# Patient Record
Sex: FEMALE | Race: WHITE | NOT HISPANIC OR LATINO
[De-identification: names, ages, dates, MRNs, and addresses within clinical notes are randomized per-mention and may not be internally consistent; named-entity substitution may affect disease eponyms.]

---

## 2023-11-09 ENCOUNTER — APPOINTMENT (OUTPATIENT)
Dept: OBGYN | Facility: CLINIC | Age: 28
End: 2023-11-09

## 2023-12-05 PROBLEM — Z00.00 ENCOUNTER FOR PREVENTIVE HEALTH EXAMINATION: Status: ACTIVE | Noted: 2023-12-05

## 2023-12-21 ENCOUNTER — APPOINTMENT (OUTPATIENT)
Dept: OBGYN | Facility: CLINIC | Age: 28
End: 2023-12-21
Payer: COMMERCIAL

## 2023-12-21 VITALS — DIASTOLIC BLOOD PRESSURE: 90 MMHG | SYSTOLIC BLOOD PRESSURE: 134 MMHG | WEIGHT: 105 LBS

## 2023-12-21 DIAGNOSIS — O20.9 HEMORRHAGE IN EARLY PREGNANCY, UNSPECIFIED: ICD-10-CM

## 2023-12-21 LAB
BILIRUB UR QL STRIP: NORMAL
GLUCOSE UR-MCNC: NORMAL
HCG UR QL: 1 EU/DL
HCG UR QL: POSITIVE
HGB UR QL STRIP.AUTO: NORMAL
KETONES UR-MCNC: NORMAL
LEUKOCYTE ESTERASE UR QL STRIP: NORMAL
NITRITE UR QL STRIP: NORMAL
PH UR STRIP: 5.5
PROT UR STRIP-MCNC: NORMAL
SP GR UR STRIP: 1.03

## 2023-12-21 PROCEDURE — 76817 TRANSVAGINAL US OBSTETRIC: CPT

## 2023-12-21 PROCEDURE — 81025 URINE PREGNANCY TEST: CPT

## 2023-12-21 PROCEDURE — 81003 URINALYSIS AUTO W/O SCOPE: CPT | Mod: QW

## 2023-12-21 PROCEDURE — 36415 COLL VENOUS BLD VENIPUNCTURE: CPT

## 2023-12-21 PROCEDURE — 99203 OFFICE O/P NEW LOW 30 MIN: CPT | Mod: 25

## 2023-12-22 LAB
BASOPHILS # BLD AUTO: 0.02 K/UL
BASOPHILS NFR BLD AUTO: 0.2 %
EOSINOPHIL # BLD AUTO: 0.04 K/UL
EOSINOPHIL NFR BLD AUTO: 0.4 %
HCT VFR BLD CALC: 43.1 %
HGB BLD-MCNC: 14.2 G/DL
HIV1+2 AB SPEC QL IA.RAPID: NONREACTIVE
IMM GRANULOCYTES NFR BLD AUTO: 0.2 %
LYMPHOCYTES # BLD AUTO: 2.08 K/UL
LYMPHOCYTES NFR BLD AUTO: 22.5 %
MAN DIFF?: NORMAL
MCHC RBC-ENTMCNC: 30.5 PG
MCHC RBC-ENTMCNC: 32.9 GM/DL
MCV RBC AUTO: 92.5 FL
MONOCYTES # BLD AUTO: 0.59 K/UL
MONOCYTES NFR BLD AUTO: 6.4 %
NEUTROPHILS # BLD AUTO: 6.48 K/UL
NEUTROPHILS NFR BLD AUTO: 70.3 %
PLATELET # BLD AUTO: 205 K/UL
PROGEST SERPL-MCNC: 53.6 NG/ML
RBC # BLD: 4.66 M/UL
RBC # FLD: 13.3 %
WBC # FLD AUTO: 9.23 K/UL

## 2023-12-24 NOTE — HISTORY OF PRESENT ILLNESS
[FreeTextEntry1] : Pt is a , LMP none, had SAB 10/30/23, bled for 5-6 days. Started staining last night, brown d/c, stopped, last relations 4 days ago Rh +   Ob/gyn Hx- Primary c/s at 40.4 wks, nr fhr @ 2cm. , hx of bilateral fibroadenomas of the breasts, followed at [facility] by Dr Lund, SAB x 1 Med/Surg hx -c/s  [Patient reported PAP Smear was normal] : Patient reported PAP Smear was normal [PapSmeardate] : 2023

## 2023-12-24 NOTE — PROCEDURE
[Transvaginal OB Sonogram] : Transvaginal OB Sonogram [Intrauterine Pregnancy] : intrauterine pregnancy [Yolk Sac] : yolk sac present [Fetal Heart] : fetal heart present [CRL: ___ (mm)] : CRL - [unfilled]Umm [Date: ___] : Date: [unfilled] [Current GA by Sonogram: ___ (wks)] : Current GA by Sonogram: [unfilled]Uwks [___ day(s)] : [unfilled] days [Transvaginal OB Sonogram WNL] : Transvaginal OB Sonogram WNL [FreeTextEntry1] : Viable IUP crl 7.5mm-6.5 wks, good fhr, edc established.for 8/10/24

## 2023-12-24 NOTE — PLAN
[FreeTextEntry1] : Viable IUP seen on today encounter. EDC established for 8/10/24, will want TOLAC, r/b/a reviewed.  -Precautions /instruction -will need records  -Prenatal labs sent from office  -Refused aneuploidy /NTD screening  -Vaccines discussed, recommend Covid vaccine -Rv 2 wks for PNC or PRN

## 2023-12-26 LAB
ABO + RH PNL BLD: NORMAL
B19V IGG SER QL IA: NEGATIVE
B19V IGG+IGM SER-IMP: NORMAL
B19V IGM FLD-ACNC: NEGATIVE
BACTERIA UR CULT: NORMAL
BLD GP AB SCN SERPL QL: NORMAL
HBV SURFACE AG SER QL: NONREACTIVE
HCV AB SER QL: NONREACTIVE
HCV S/CO RATIO: 0.22 S/CO
LEAD BLD-MCNC: <1 UG/DL
MEV IGG FLD QL IA: 101 AU/ML
MEV IGG+IGM SER-IMP: POSITIVE
MUV AB SER-ACNC: POSITIVE
MUV IGG SER QL IA: 40 AU/ML
RUBV IGG FLD-ACNC: 11 INDEX
RUBV IGG SER-IMP: POSITIVE
T PALLIDUM AB SER QL IA: NEGATIVE
VZV AB TITR SER: POSITIVE
VZV IGG SER IF-ACNC: 452.9 INDEX

## 2024-01-04 ENCOUNTER — TRANSCRIPTION ENCOUNTER (OUTPATIENT)
Age: 29
End: 2024-01-04

## 2024-01-11 ENCOUNTER — APPOINTMENT (OUTPATIENT)
Dept: OBGYN | Facility: CLINIC | Age: 29
End: 2024-01-11
Payer: COMMERCIAL

## 2024-01-11 VITALS — DIASTOLIC BLOOD PRESSURE: 84 MMHG | SYSTOLIC BLOOD PRESSURE: 128 MMHG | WEIGHT: 106 LBS

## 2024-01-11 DIAGNOSIS — O36.80X0 PREGNANCY WITH INCONCLUSIVE FETAL VIABILITY, NOT APPLICABLE OR UNSPECIFIED: ICD-10-CM

## 2024-01-11 PROCEDURE — 0501F PRENATAL FLOW SHEET: CPT

## 2024-01-11 PROCEDURE — 76817 TRANSVAGINAL US OBSTETRIC: CPT

## 2024-01-11 PROCEDURE — 36415 COLL VENOUS BLD VENIPUNCTURE: CPT

## 2024-01-14 PROBLEM — O36.80X0 ENCOUNTER TO DETERMINE FETAL VIABILITY OF PREGNANCY: Status: ACTIVE | Noted: 2024-01-14

## 2024-01-17 ENCOUNTER — NON-APPOINTMENT (OUTPATIENT)
Age: 29
End: 2024-01-17

## 2024-02-06 ENCOUNTER — NON-APPOINTMENT (OUTPATIENT)
Age: 29
End: 2024-02-06

## 2024-02-08 ENCOUNTER — APPOINTMENT (OUTPATIENT)
Dept: OBGYN | Facility: CLINIC | Age: 29
End: 2024-02-08
Payer: COMMERCIAL

## 2024-02-08 VITALS — DIASTOLIC BLOOD PRESSURE: 88 MMHG | WEIGHT: 108 LBS | SYSTOLIC BLOOD PRESSURE: 138 MMHG

## 2024-02-08 DIAGNOSIS — I10 ESSENTIAL (PRIMARY) HYPERTENSION: ICD-10-CM

## 2024-02-08 LAB
BILIRUB UR QL STRIP: NORMAL
GLUCOSE UR-MCNC: NORMAL
HCG UR QL: 0.2 EU/DL
HGB UR QL STRIP.AUTO: NORMAL
KETONES UR-MCNC: NORMAL
LEUKOCYTE ESTERASE UR QL STRIP: NORMAL
NITRITE UR QL STRIP: NORMAL
PH UR STRIP: 5.5
PROT UR STRIP-MCNC: NORMAL
SP GR UR STRIP: 1.02

## 2024-02-08 PROCEDURE — 36415 COLL VENOUS BLD VENIPUNCTURE: CPT

## 2024-02-08 PROCEDURE — 81003 URINALYSIS AUTO W/O SCOPE: CPT | Mod: NC,QW

## 2024-02-08 PROCEDURE — 0502F SUBSEQUENT PRENATAL CARE: CPT

## 2024-02-08 RX ORDER — BLOOD PRESSURE TEST KIT-LARGE
KIT MISCELLANEOUS
Qty: 1 | Refills: 0 | Status: ACTIVE | COMMUNITY
Start: 2024-02-08 | End: 1900-01-01

## 2024-02-09 ENCOUNTER — NON-APPOINTMENT (OUTPATIENT)
Age: 29
End: 2024-02-09

## 2024-02-09 LAB
ALBUMIN SERPL ELPH-MCNC: 4.4 G/DL
ALP BLD-CCNC: 52 U/L
ALT SERPL-CCNC: 7 U/L
ANION GAP SERPL CALC-SCNC: 15 MMOL/L
AST SERPL-CCNC: 15 U/L
BILIRUB SERPL-MCNC: 1.3 MG/DL
BUN SERPL-MCNC: 7 MG/DL
CALCIUM SERPL-MCNC: 9.8 MG/DL
CHLORIDE SERPL-SCNC: 104 MMOL/L
CO2 SERPL-SCNC: 22 MMOL/L
CREAT SERPL-MCNC: 0.41 MG/DL
EGFR: 138 ML/MIN/1.73M2
GLUCOSE SERPL-MCNC: 54 MG/DL
LDH SERPL-CCNC: 182 U/L
POTASSIUM SERPL-SCNC: 4.2 MMOL/L
PROT SERPL-MCNC: 6.5 G/DL
SODIUM SERPL-SCNC: 140 MMOL/L
URATE SERPL-MCNC: 2.2 MG/DL

## 2024-03-04 ENCOUNTER — RESULT CHARGE (OUTPATIENT)
Age: 29
End: 2024-03-04

## 2024-03-04 ENCOUNTER — APPOINTMENT (OUTPATIENT)
Dept: OBGYN | Facility: CLINIC | Age: 29
End: 2024-03-04
Payer: COMMERCIAL

## 2024-03-04 VITALS
DIASTOLIC BLOOD PRESSURE: 83 MMHG | WEIGHT: 112 LBS | SYSTOLIC BLOOD PRESSURE: 132 MMHG | HEIGHT: 66 IN | BODY MASS INDEX: 18 KG/M2

## 2024-03-04 LAB
BILIRUB UR QL STRIP: NORMAL
GLUCOSE UR-MCNC: NORMAL
HCG UR QL: 0.2 EU/DL
HGB UR QL STRIP.AUTO: NORMAL
KETONES UR-MCNC: NORMAL
LEUKOCYTE ESTERASE UR QL STRIP: NORMAL
NITRITE UR QL STRIP: NORMAL
PH UR STRIP: 5.5
PROT UR STRIP-MCNC: NORMAL
SP GR UR STRIP: 1.03

## 2024-03-04 PROCEDURE — 81003 URINALYSIS AUTO W/O SCOPE: CPT | Mod: NC,QW

## 2024-03-04 PROCEDURE — 0502F SUBSEQUENT PRENATAL CARE: CPT

## 2024-03-06 LAB
CREAT 24H UR-MCNC: 0.9 G/24 HR
CREAT 24H UR-MCNC: 0.9 G/24 HR
CREAT ?TM UR-MCNC: 60 MG/DL
CREAT ?TM UR-MCNC: 60 MG/DL
PROT 24H UR-MRATE: 6 MG/DL
PROT ?TM UR-MCNC: 24 HR
PROT ?TM UR-MCNC: 24 HR
PROT UR-MCNC: 90 MG/24 H
SPECIMEN VOL 24H UR: 1500 ML
SPECIMEN VOL 24H UR: 1500 ML

## 2024-03-28 ENCOUNTER — NON-APPOINTMENT (OUTPATIENT)
Age: 29
End: 2024-03-28

## 2024-04-01 ENCOUNTER — ASOB RESULT (OUTPATIENT)
Age: 29
End: 2024-04-01

## 2024-04-01 ENCOUNTER — APPOINTMENT (OUTPATIENT)
Dept: OBGYN | Facility: CLINIC | Age: 29
End: 2024-04-01
Payer: COMMERCIAL

## 2024-04-01 ENCOUNTER — APPOINTMENT (OUTPATIENT)
Dept: ANTEPARTUM | Facility: CLINIC | Age: 29
End: 2024-04-01
Payer: COMMERCIAL

## 2024-04-01 VITALS — SYSTOLIC BLOOD PRESSURE: 131 MMHG | BODY MASS INDEX: 18.72 KG/M2 | WEIGHT: 116 LBS | DIASTOLIC BLOOD PRESSURE: 84 MMHG

## 2024-04-01 LAB
BILIRUB UR QL STRIP: NORMAL
GLUCOSE UR-MCNC: 100
HCG UR QL: 0.2 EU/DL
HGB UR QL STRIP.AUTO: NORMAL
KETONES UR-MCNC: NORMAL
LEUKOCYTE ESTERASE UR QL STRIP: NORMAL
NITRITE UR QL STRIP: NORMAL
PH UR STRIP: 6
PROT UR STRIP-MCNC: NORMAL
SP GR UR STRIP: 1.01

## 2024-04-01 PROCEDURE — 36415 COLL VENOUS BLD VENIPUNCTURE: CPT

## 2024-04-01 PROCEDURE — 0502F SUBSEQUENT PRENATAL CARE: CPT

## 2024-04-01 PROCEDURE — 81003 URINALYSIS AUTO W/O SCOPE: CPT | Mod: NC,QW

## 2024-04-01 PROCEDURE — 76811 OB US DETAILED SNGL FETUS: CPT

## 2024-04-07 ENCOUNTER — NON-APPOINTMENT (OUTPATIENT)
Age: 29
End: 2024-04-07

## 2024-04-07 LAB
AFP MOM: 0.64
AFP VALUE: 53.2 NG/ML
ALPHA FETOPROTEIN SERUM COMMENT: NORMAL
ALPHA FETOPROTEIN SERUM INTERPRETATION: NORMAL
ALPHA FETOPROTEIN SERUM RESULTS: NORMAL
ALPHA FETOPROTEIN SERUM TEST RESULTS: NORMAL
GESTATIONAL AGE BASED ON: NORMAL
GESTATIONAL AGE ON COLLECTION DATE: 21.3 WEEKS
INSULIN DEP DIABETES: NO
MATERNAL AGE AT EDD AFP: 27.5 YR
MULTIPLE GESTATION: NO
OSBR RISK 1 IN: NORMAL
RACE: NORMAL
WEIGHT AFP: 116 LBS

## 2024-05-02 ENCOUNTER — NON-APPOINTMENT (OUTPATIENT)
Age: 29
End: 2024-05-02

## 2024-05-06 ENCOUNTER — ASOB RESULT (OUTPATIENT)
Age: 29
End: 2024-05-06

## 2024-05-06 ENCOUNTER — APPOINTMENT (OUTPATIENT)
Dept: OBGYN | Facility: CLINIC | Age: 29
End: 2024-05-06
Payer: COMMERCIAL

## 2024-05-06 ENCOUNTER — APPOINTMENT (OUTPATIENT)
Dept: ANTEPARTUM | Facility: CLINIC | Age: 29
End: 2024-05-06
Payer: COMMERCIAL

## 2024-05-06 ENCOUNTER — NON-APPOINTMENT (OUTPATIENT)
Age: 29
End: 2024-05-06

## 2024-05-06 VITALS — WEIGHT: 126 LBS | SYSTOLIC BLOOD PRESSURE: 120 MMHG | BODY MASS INDEX: 20.34 KG/M2 | DIASTOLIC BLOOD PRESSURE: 81 MMHG

## 2024-05-06 LAB
BILIRUB UR QL STRIP: NORMAL
GLUCOSE UR-MCNC: 100
HCG UR QL: 0.2 EU/DL
HGB UR QL STRIP.AUTO: NORMAL
KETONES UR-MCNC: NORMAL
LEUKOCYTE ESTERASE UR QL STRIP: NORMAL
NITRITE UR QL STRIP: NORMAL
PH UR STRIP: 6.5
PROT UR STRIP-MCNC: NORMAL
SP GR UR STRIP: 1.01

## 2024-05-06 PROCEDURE — 76816 OB US FOLLOW-UP PER FETUS: CPT

## 2024-05-06 PROCEDURE — 81003 URINALYSIS AUTO W/O SCOPE: CPT | Mod: NC,QW

## 2024-05-06 PROCEDURE — 36415 COLL VENOUS BLD VENIPUNCTURE: CPT

## 2024-05-06 PROCEDURE — 0502F SUBSEQUENT PRENATAL CARE: CPT

## 2024-05-07 ENCOUNTER — NON-APPOINTMENT (OUTPATIENT)
Age: 29
End: 2024-05-07

## 2024-05-07 LAB
GLUCOSE 1H P 50 G GLC PO SERPL-MCNC: 122 MG/DL
HCT VFR BLD CALC: 36 %
HGB BLD-MCNC: 11.6 G/DL
MCHC RBC-ENTMCNC: 32.2 GM/DL
MCHC RBC-ENTMCNC: 33.2 PG
MCV RBC AUTO: 103.2 FL
PLATELET # BLD AUTO: 128 K/UL
RBC # BLD: 3.49 M/UL
RBC # FLD: 14.3 %
WBC # FLD AUTO: 8.96 K/UL

## 2024-05-13 ENCOUNTER — APPOINTMENT (OUTPATIENT)
Dept: ANTEPARTUM | Facility: CLINIC | Age: 29
End: 2024-05-13

## 2024-06-06 ENCOUNTER — APPOINTMENT (OUTPATIENT)
Dept: OBGYN | Facility: CLINIC | Age: 29
End: 2024-06-06
Payer: COMMERCIAL

## 2024-06-06 VITALS — DIASTOLIC BLOOD PRESSURE: 71 MMHG | WEIGHT: 130 LBS | BODY MASS INDEX: 20.98 KG/M2 | SYSTOLIC BLOOD PRESSURE: 112 MMHG

## 2024-06-06 LAB
BILIRUB UR QL STRIP: NORMAL
GLUCOSE UR-MCNC: NORMAL
HCG UR QL: 0.2 EU/DL
HGB UR QL STRIP.AUTO: NORMAL
KETONES UR-MCNC: NORMAL
LEUKOCYTE ESTERASE UR QL STRIP: NORMAL
NITRITE UR QL STRIP: NORMAL
PH UR STRIP: 6
PROT UR STRIP-MCNC: NORMAL
SP GR UR STRIP: 1.03

## 2024-06-06 PROCEDURE — 90471 IMMUNIZATION ADMIN: CPT

## 2024-06-06 PROCEDURE — 90715 TDAP VACCINE 7 YRS/> IM: CPT

## 2024-06-06 PROCEDURE — 81003 URINALYSIS AUTO W/O SCOPE: CPT | Mod: NC,QW

## 2024-06-06 PROCEDURE — 0502F SUBSEQUENT PRENATAL CARE: CPT

## 2024-06-26 ENCOUNTER — APPOINTMENT (OUTPATIENT)
Dept: OBGYN | Facility: CLINIC | Age: 29
End: 2024-06-26
Payer: COMMERCIAL

## 2024-06-26 ENCOUNTER — ASOB RESULT (OUTPATIENT)
Age: 29
End: 2024-06-26

## 2024-06-26 ENCOUNTER — APPOINTMENT (OUTPATIENT)
Dept: ANTEPARTUM | Facility: CLINIC | Age: 29
End: 2024-06-26
Payer: COMMERCIAL

## 2024-06-26 VITALS — SYSTOLIC BLOOD PRESSURE: 120 MMHG | WEIGHT: 132 LBS | DIASTOLIC BLOOD PRESSURE: 75 MMHG | BODY MASS INDEX: 21.31 KG/M2

## 2024-06-26 DIAGNOSIS — Z34.90 ENCOUNTER FOR SUPERVISION OF NORMAL PREGNANCY, UNSPECIFIED, UNSPECIFIED TRIMESTER: ICD-10-CM

## 2024-06-26 LAB
BILIRUB UR QL STRIP: NORMAL
GLUCOSE UR-MCNC: NORMAL
HCG UR QL: 0.2 EU/DL
HGB UR QL STRIP.AUTO: NORMAL
KETONES UR-MCNC: NORMAL
LEUKOCYTE ESTERASE UR QL STRIP: NORMAL
NITRITE UR QL STRIP: NORMAL
PH UR STRIP: 7
PROT UR STRIP-MCNC: NORMAL
SP GR UR STRIP: 1.02

## 2024-06-26 PROCEDURE — 76819 FETAL BIOPHYS PROFIL W/O NST: CPT | Mod: 59

## 2024-06-26 PROCEDURE — 81003 URINALYSIS AUTO W/O SCOPE: CPT | Mod: NC,QW

## 2024-06-26 PROCEDURE — 0502F SUBSEQUENT PRENATAL CARE: CPT

## 2024-06-26 PROCEDURE — 76816 OB US FOLLOW-UP PER FETUS: CPT

## 2024-07-16 ENCOUNTER — APPOINTMENT (OUTPATIENT)
Dept: OBGYN | Facility: CLINIC | Age: 29
End: 2024-07-16

## 2024-07-16 ENCOUNTER — LABORATORY RESULT (OUTPATIENT)
Age: 29
End: 2024-07-16

## 2024-07-16 VITALS — BODY MASS INDEX: 22.27 KG/M2 | SYSTOLIC BLOOD PRESSURE: 115 MMHG | WEIGHT: 138 LBS | DIASTOLIC BLOOD PRESSURE: 84 MMHG

## 2024-07-16 PROCEDURE — 36415 COLL VENOUS BLD VENIPUNCTURE: CPT

## 2024-07-16 PROCEDURE — 81003 URINALYSIS AUTO W/O SCOPE: CPT | Mod: NC,QW

## 2024-07-16 PROCEDURE — 0502F SUBSEQUENT PRENATAL CARE: CPT

## 2024-07-17 LAB
BASOPHILS # BLD AUTO: 0.03 K/UL
BASOPHILS NFR BLD AUTO: 0.2 %
BILIRUB UR QL STRIP: NORMAL
EOSINOPHIL # BLD AUTO: 0.08 K/UL
EOSINOPHIL NFR BLD AUTO: 0.6 %
GLUCOSE UR-MCNC: NORMAL
HCG UR QL: 0.2 EU/DL
HCT VFR BLD CALC: 38.7 %
HGB BLD-MCNC: 12.9 G/DL
HGB UR QL STRIP.AUTO: NORMAL
HIV1+2 AB SPEC QL IA.RAPID: NONREACTIVE
IMM GRANULOCYTES NFR BLD AUTO: 0.4 %
KETONES UR-MCNC: NORMAL
LEUKOCYTE ESTERASE UR QL STRIP: NORMAL
LYMPHOCYTES # BLD AUTO: 2.53 K/UL
LYMPHOCYTES NFR BLD AUTO: 19.3 %
MAN DIFF?: NORMAL
MCHC RBC-ENTMCNC: 33.3 GM/DL
MCHC RBC-ENTMCNC: 33.7 PG
MCV RBC AUTO: 101 FL
MONOCYTES # BLD AUTO: 0.71 K/UL
MONOCYTES NFR BLD AUTO: 5.4 %
NEUTROPHILS # BLD AUTO: 9.7 K/UL
NEUTROPHILS NFR BLD AUTO: 74.1 %
NITRITE UR QL STRIP: NORMAL
PH UR STRIP: 7
PLATELET # BLD AUTO: 127 K/UL
PROT UR STRIP-MCNC: NORMAL
RBC # BLD: 3.83 M/UL
RBC # FLD: 14.5 %
SP GR UR STRIP: 1.01
T PALLIDUM AB SER QL IA: NEGATIVE
WBC # FLD AUTO: 13.1 K/UL

## 2024-07-19 LAB — B-HEM STREP SPEC QL CULT: NORMAL

## 2024-07-23 ENCOUNTER — APPOINTMENT (OUTPATIENT)
Dept: OBGYN | Facility: CLINIC | Age: 29
End: 2024-07-23
Payer: COMMERCIAL

## 2024-07-23 VITALS — BODY MASS INDEX: 22.27 KG/M2 | SYSTOLIC BLOOD PRESSURE: 137 MMHG | DIASTOLIC BLOOD PRESSURE: 84 MMHG | WEIGHT: 138 LBS

## 2024-07-23 DIAGNOSIS — Z34.90 ENCOUNTER FOR SUPERVISION OF NORMAL PREGNANCY, UNSPECIFIED, UNSPECIFIED TRIMESTER: ICD-10-CM

## 2024-07-23 PROCEDURE — 0502F SUBSEQUENT PRENATAL CARE: CPT

## 2024-07-23 PROCEDURE — 81003 URINALYSIS AUTO W/O SCOPE: CPT | Mod: NC,QW

## 2024-07-24 LAB
BILIRUB UR QL STRIP: NORMAL
GLUCOSE UR-MCNC: NORMAL
HCG UR QL: 0.2 EU/DL
HGB UR QL STRIP.AUTO: NORMAL
KETONES UR-MCNC: NORMAL
LEUKOCYTE ESTERASE UR QL STRIP: NORMAL
NITRITE UR QL STRIP: NORMAL
PH UR STRIP: 6
PROT UR STRIP-MCNC: NORMAL
SP GR UR STRIP: 1.02

## 2024-07-28 ENCOUNTER — INPATIENT (INPATIENT)
Facility: HOSPITAL | Age: 29
LOS: 1 days | Discharge: HOME | End: 2024-07-30
Attending: OBSTETRICS & GYNECOLOGY | Admitting: OBSTETRICS & GYNECOLOGY
Payer: COMMERCIAL

## 2024-07-28 VITALS — HEART RATE: 90 BPM | DIASTOLIC BLOOD PRESSURE: 85 MMHG | SYSTOLIC BLOOD PRESSURE: 142 MMHG

## 2024-07-28 DIAGNOSIS — O42.92 FULL-TERM PREMATURE RUPTURE OF MEMBRANES, UNSPECIFIED AS TO LENGTH OF TIME BETWEEN RUPTURE AND ONSET OF LABOR: ICD-10-CM

## 2024-07-28 DIAGNOSIS — Z98.891 HISTORY OF UTERINE SCAR FROM PREVIOUS SURGERY: Chronic | ICD-10-CM

## 2024-07-28 DIAGNOSIS — O26.899 OTHER SPECIFIED PREGNANCY RELATED CONDITIONS, UNSPECIFIED TRIMESTER: ICD-10-CM

## 2024-07-28 LAB
ALBUMIN SERPL ELPH-MCNC: 3.7 G/DL — SIGNIFICANT CHANGE UP (ref 3.5–5.2)
ALP SERPL-CCNC: 235 U/L — HIGH (ref 30–115)
ALT FLD-CCNC: 10 U/L — SIGNIFICANT CHANGE UP (ref 0–41)
ANION GAP SERPL CALC-SCNC: 11 MMOL/L — SIGNIFICANT CHANGE UP (ref 7–14)
APPEARANCE UR: CLEAR — SIGNIFICANT CHANGE UP
AST SERPL-CCNC: 17 U/L — SIGNIFICANT CHANGE UP (ref 0–41)
BASOPHILS # BLD AUTO: 0.03 K/UL — SIGNIFICANT CHANGE UP (ref 0–0.2)
BASOPHILS NFR BLD AUTO: 0.2 % — SIGNIFICANT CHANGE UP (ref 0–1)
BILIRUB SERPL-MCNC: 0.7 MG/DL — SIGNIFICANT CHANGE UP (ref 0.2–1.2)
BILIRUB UR-MCNC: NEGATIVE — SIGNIFICANT CHANGE UP
BUN SERPL-MCNC: 7 MG/DL — LOW (ref 10–20)
CALCIUM SERPL-MCNC: 8.9 MG/DL — SIGNIFICANT CHANGE UP (ref 8.4–10.5)
CHLORIDE SERPL-SCNC: 103 MMOL/L — SIGNIFICANT CHANGE UP (ref 98–110)
CO2 SERPL-SCNC: 21 MMOL/L — SIGNIFICANT CHANGE UP (ref 17–32)
COLOR SPEC: YELLOW — SIGNIFICANT CHANGE UP
CREAT ?TM UR-MCNC: 42 MG/DL — SIGNIFICANT CHANGE UP
CREAT SERPL-MCNC: <0.5 MG/DL — LOW (ref 0.7–1.5)
DIFF PNL FLD: NEGATIVE — SIGNIFICANT CHANGE UP
EGFR: 139 ML/MIN/1.73M2 — SIGNIFICANT CHANGE UP
EOSINOPHIL # BLD AUTO: 0.09 K/UL — SIGNIFICANT CHANGE UP (ref 0–0.7)
EOSINOPHIL NFR BLD AUTO: 0.7 % — SIGNIFICANT CHANGE UP (ref 0–8)
GLUCOSE SERPL-MCNC: 89 MG/DL — SIGNIFICANT CHANGE UP (ref 70–99)
GLUCOSE UR QL: NEGATIVE MG/DL — SIGNIFICANT CHANGE UP
HCT VFR BLD CALC: 36.9 % — LOW (ref 37–47)
HGB BLD-MCNC: 12.9 G/DL — SIGNIFICANT CHANGE UP (ref 12–16)
IMM GRANULOCYTES NFR BLD AUTO: 0.4 % — HIGH (ref 0.1–0.3)
KETONES UR-MCNC: NEGATIVE MG/DL — SIGNIFICANT CHANGE UP
LDH SERPL L TO P-CCNC: 185 — SIGNIFICANT CHANGE UP (ref 50–242)
LEUKOCYTE ESTERASE UR-ACNC: NEGATIVE — SIGNIFICANT CHANGE UP
LYMPHOCYTES # BLD AUTO: 2.74 K/UL — SIGNIFICANT CHANGE UP (ref 1.2–3.4)
LYMPHOCYTES # BLD AUTO: 20.7 % — SIGNIFICANT CHANGE UP (ref 20.5–51.1)
MCHC RBC-ENTMCNC: 33.2 PG — HIGH (ref 27–31)
MCHC RBC-ENTMCNC: 35 G/DL — SIGNIFICANT CHANGE UP (ref 32–37)
MCV RBC AUTO: 95.1 FL — SIGNIFICANT CHANGE UP (ref 81–99)
MONOCYTES # BLD AUTO: 0.63 K/UL — HIGH (ref 0.1–0.6)
MONOCYTES NFR BLD AUTO: 4.8 % — SIGNIFICANT CHANGE UP (ref 1.7–9.3)
NEUTROPHILS # BLD AUTO: 9.67 K/UL — HIGH (ref 1.4–6.5)
NEUTROPHILS NFR BLD AUTO: 73.2 % — SIGNIFICANT CHANGE UP (ref 42.2–75.2)
NITRITE UR-MCNC: NEGATIVE — SIGNIFICANT CHANGE UP
NRBC # BLD: 0 /100 WBCS — SIGNIFICANT CHANGE UP (ref 0–0)
PH UR: 6.5 — SIGNIFICANT CHANGE UP (ref 5–8)
PLATELET # BLD AUTO: 147 K/UL — SIGNIFICANT CHANGE UP (ref 130–400)
PMV BLD: 12.7 FL — HIGH (ref 7.4–10.4)
POTASSIUM SERPL-MCNC: 4.1 MMOL/L — SIGNIFICANT CHANGE UP (ref 3.5–5)
POTASSIUM SERPL-SCNC: 4.1 MMOL/L — SIGNIFICANT CHANGE UP (ref 3.5–5)
PROT ?TM UR-MCNC: 5 MG/DLG/24H — SIGNIFICANT CHANGE UP
PROT SERPL-MCNC: 6.1 G/DL — SIGNIFICANT CHANGE UP (ref 6–8)
PROT UR-MCNC: NEGATIVE MG/DL — SIGNIFICANT CHANGE UP
PROT/CREAT UR-RTO: 0.1 RATIO — SIGNIFICANT CHANGE UP (ref 0–0.2)
RBC # BLD: 3.88 M/UL — LOW (ref 4.2–5.4)
RBC # FLD: 13 % — SIGNIFICANT CHANGE UP (ref 11.5–14.5)
SODIUM SERPL-SCNC: 135 MMOL/L — SIGNIFICANT CHANGE UP (ref 135–146)
SP GR SPEC: 1.01 — SIGNIFICANT CHANGE UP (ref 1–1.03)
URATE SERPL-MCNC: 3.1 MG/DL — SIGNIFICANT CHANGE UP (ref 2.5–7)
UROBILINOGEN FLD QL: 0.2 MG/DL — SIGNIFICANT CHANGE UP (ref 0.2–1)
WBC # BLD: 13.21 K/UL — HIGH (ref 4.8–10.8)
WBC # FLD AUTO: 13.21 K/UL — HIGH (ref 4.8–10.8)

## 2024-07-28 PROCEDURE — 85025 COMPLETE CBC W/AUTO DIFF WBC: CPT

## 2024-07-28 PROCEDURE — 86592 SYPHILIS TEST NON-TREP QUAL: CPT

## 2024-07-28 PROCEDURE — 86850 RBC ANTIBODY SCREEN: CPT

## 2024-07-28 PROCEDURE — 59050 FETAL MONITOR W/REPORT: CPT

## 2024-07-28 PROCEDURE — 86900 BLOOD TYPING SEROLOGIC ABO: CPT

## 2024-07-28 PROCEDURE — 86901 BLOOD TYPING SEROLOGIC RH(D): CPT

## 2024-07-28 PROCEDURE — 36415 COLL VENOUS BLD VENIPUNCTURE: CPT

## 2024-07-28 RX ORDER — OXYTOCIN/RINGER'S LACTATE 20/1000 ML
333.33 PLASTIC BAG, INJECTION (ML) INTRAVENOUS
Qty: 20 | Refills: 0 | Status: DISCONTINUED | OUTPATIENT
Start: 2024-07-28 | End: 2024-07-29

## 2024-07-28 RX ORDER — DEXTROSE MONOHYDRATE, SODIUM CHLORIDE, SODIUM LACTATE, CALCIUM CHLORIDE, MAGNESIUM CHLORIDE 1.5; 538; 448; 18.4; 5.08 G/100ML; MG/100ML; MG/100ML; MG/100ML; MG/100ML
1000 SOLUTION INTRAPERITONEAL
Refills: 0 | Status: DISCONTINUED | OUTPATIENT
Start: 2024-07-28 | End: 2024-07-29

## 2024-07-28 RX ORDER — OXYTOCIN/RINGER'S LACTATE 20/1000 ML
1 PLASTIC BAG, INJECTION (ML) INTRAVENOUS
Qty: 30 | Refills: 0 | Status: DISCONTINUED | OUTPATIENT
Start: 2024-07-28 | End: 2024-07-29

## 2024-07-28 RX ADMIN — DEXTROSE MONOHYDRATE, SODIUM CHLORIDE, SODIUM LACTATE, CALCIUM CHLORIDE, MAGNESIUM CHLORIDE 125 MILLILITER(S): 1.5; 538; 448; 18.4; 5.08 SOLUTION INTRAPERITONEAL at 09:47

## 2024-07-28 RX ADMIN — Medication 1 MILLIUNIT(S)/MIN: at 08:59

## 2024-07-28 NOTE — OB RN DELIVERY SUMMARY - NSSELHIDDEN_OBGYN_ALL_OB_FT
[NS_DeliveryAttending1_OBGYN_ALL_OB_FT:[email or web address]=],[NS_DeliveryRN_OBGYN_ALL_OB_FT:[id number]=]

## 2024-07-28 NOTE — OB PROVIDER H&P - NS_OBGYNHISTORY_OBGYN_ALL_OB_FT
ObHx:   FT C/S for NRFHT, 6-7lbs    GynHx: Denies h/o ovarian cysts, uterine fibroids, abnormal pap smears, or STIs

## 2024-07-28 NOTE — OB PROVIDER TRIAGE NOTE - NSOBPROVIDERNOTE_OBGYN_ALL_OB_FT
28yo  at 38w1d, GBS neg, h/o prior c/s, for BP monitoring, r/o gHTN vs preeclampsia.   - f/u BPs q15min  - f/u PELs  - Continue EFM & TOCO

## 2024-07-28 NOTE — OB PROVIDER H&P - NSHPPHYSICALEXAM_GEN_ALL_CORE
HR: 91 (07-28-24 @ 05:16) (72 - 91)  BP: 156/94 (07-28-24 @ 05:16) (113/73 - 156/94)  RR: 18 (07-28-24 @ 02:42) (18 - 18)    Gen: A+OX3. NAD  Cardiac: RRR, no murmurs, rubs, or gallops  Lungs: CTAB  Abd: Soft, Nontender. Gravid. no RUQ tenderness  Reflexes: 2+ UE reflexes  Extremeties: no LE edema    SVE: deferred  BPP: vtx, mvp 7.1cm, BPP 8/8    FHR: 135/mod/+accel   TOCO: irregular

## 2024-07-28 NOTE — OB PROVIDER H&P - ATTENDING COMMENTS
Patient seen and evaluated, no current PIH sx.  Hx as above  strongly desires TOLAC, r/b/a rev, ques answered, informed consent obtained  precautions

## 2024-07-28 NOTE — OB PROVIDER TRIAGE NOTE - IN ACCORDANCE WITH [place] STATE LAW, WE OFFER EVERY PATIENT A HEPATITIS C TEST. WOULD YOU LIKE TO BE TESTED TODAY?
Anticoagulation Summary  As of 8/15/2018    INR goal:   2.0-3.0   TTR:   82.7 % (7.8 mo)   Today's INR:   1.70!   Warfarin maintenance plan:   0.5 mg (1 mg x 0.5) on Mon, Fri; 1 mg (1 mg x 1) all other days   Weekly warfarin total:   6 mg   Plan last modified:   Pieter Covarrubias, PharmD (4/12/2018)   Next INR check:   9/6/2018   Target end date:   Indefinite    Indications    Paroxysmal a-fib (CMS-HCC) [I48.0]  Chronic anticoagulation [Z79.01]             Anticoagulation Episode Summary     INR check location:   [facility]    Preferred lab:       Send INR reminders to:       Comments:   [phone] (H)      Anticoagulation Care Providers     Provider Role Specialty Phone number    Evelio Tejeda M.D. Referring Internal Medicine [phone]    [facility] Responsible  [phone]    Beryl Pang M.D. Responsible Family Medicine [phone]        Anticoagulation Patient Findings  Patient Findings     Negatives:   Signs/symptoms of thrombosis, Signs/symptoms of bleeding, Laboratory test error suspected, Change in health, Change in alcohol use, Change in activity, Upcoming invasive procedure, Emergency department visit, Upcoming dental procedure, Missed doses, Extra doses, Change in medications, Change in diet/appetite, Hospital admission, Bruising, Other complaints        Spoke with patient today regarding subtherapeutic INR of 1.7.  Patient denies any signs/symptoms of bruising or bleeding or any changes in diet and medications.  Instructed patient to call clinic with any questions or concerns.  Patient misunderstood dosing instructions from last INR and held one of her doses ~two weeks ago.  Will have her bolus with 1.5mg X 1, then resume current warfarin regimen.  Follow up in 3 weeks (at previously scheduled clinic appt), to reduce risk of adverse events related to this high risk medication,  Warfarin.    Major Hughes, YaneliD       Opt out

## 2024-07-28 NOTE — OB PROVIDER TRIAGE NOTE - NSHPPHYSICALEXAM_GEN_ALL_CORE
HR: 80 (07-28-24 @ 03:31) (74 - 90)  BP: 119/76 (07-28-24 @ 03:31) (113/73 - 142/85)  RR: 18 (07-28-24 @ 02:42) (18 - 18)    Gen: A+OX3. NAD  Cardiac: RRR, no murmurs, rubs, or gallops  Lungs: CTAB  Abd: Soft, Nontender. Gravid. no RUQ tenderness  Reflexes: 2+ UE reflexes  Extremeties: no LE edema  SVE: deferred  BPP: vtx, mvp 7.1cm, BPP 8/8    FHR: 135/mod/+accel   TOCO: irregular

## 2024-07-28 NOTE — OB PROVIDER H&P - PRO PRENATAL LABS ORI SOURCE ANTIBODY SCREEN
Return if symptoms worsen or new symptoms develop.  Follow-up with primary care physician next week for recheck.  Stroke workup was unremarkable and stroke team felt comfortable with the workup.  No evidence of acute abnormality on chest pain workup.  Please drink plenty of fluid rest and eat and drink healthy.  If any worsening symptoms please return for recheck.   SCM

## 2024-07-28 NOTE — OB RN PATIENT PROFILE - NSNAMEOFMDOFFICE_OBGYN_ALL_OB_FT
[facility] INPATIENT ENCOUNTER  PHYSICAL MEDICINE AND REHABILITATION  Progress note       Chief Complaint: Patient requires admission to acute inpatient rehabilitation due to dermatomyositis    11/19 The patient was seen and examined this morning.  She is pleasant.  She participated with physical therapy while on the medical unit the previous afternoon.  She endorses an eagerness to get stronger and return home.  She worked with speech therapy this morning and her diet was upgraded to a general diet with thin liquids.  The patient has been mildly hypertensive.  She is on Cozaar for management.  She is currently asymptomatic.  No chest pains or shortness of breath.  Chronic bilateral lower extremity swelling.  She is currently wearing compression garments.  No issues with bowel or bladder.    11/20 Patient was seen and examined.  She is sitting up in bed eating breakfast.  She denies HA, dizziness, nausea.  She denies pain. Vital signs reviewed and she is afebrile. Systolic blood pressures 139-164.  Patient did report a BM this morning and voiding.  She is incontinent of bladder.     11/21/2020: Patient seen and examined.  She reports good appetite.  She is trying to eat softer foods.  She is happy to be on a general diet.  No pain complaints.  Voiding spontaneously.  Recent BM noted.  She is motivated to participate in therapies.  She slept well last night.  SBP in the 130s to 150s.  She is on a prednisone taper.      Medications  Current Facility-Administered Medications   Medication   • predniSONE (DELTASONE) tablet 7.5 mg   • magnesium oxide (MAG-OX) tablet Tab 400 mg   • azaTHIOprine 50 MG/ML (compounded) suspension 25 mg   • docusate sodium (COLACE) capsule 100 mg   • enoxaparin (LOVENOX) injection 40 mg   • losartan (COZAAR) tablet 25 mg   • pantoprazole (PROTONIX) EC tablet 40 mg   • sodium chloride 0.9 % flush bag 25 mL        Review of Systems:   Review of Systems   Constitutional: Negative for chills  and fever.   HENT: Negative for congestion and sore throat.         Swallowing is improving   Respiratory: Negative for cough, shortness of breath and wheezing.    Cardiovascular: Negative for palpitations and leg swelling.   Gastrointestinal: Negative for abdominal pain, constipation, diarrhea, nausea and vomiting.   Genitourinary: Negative for dysuria.   Musculoskeletal: Negative for back pain and joint pain.   Skin: Negative for itching and rash.   Neurological: Positive for weakness. Negative for dizziness and headaches.   All other systems reviewed and are negative.       Last Recorded Vitals    Vital Last Value 24 Hour Range   Temperature 97.7 °F (36.5 °C) (11/21/20 0709) Temp  Min: 97.5 °F (36.4 °C)  Max: 97.7 °F (36.5 °C)   Pulse 82 (11/21/20 0709) Pulse  Min: 82  Max: 90   Respiratory 16 (11/21/20 0709) Resp  Min: 16  Max: 16   Non-Invasive  Blood Pressure (!) 158/73 (11/21/20 0709) BP  Min: 138/80  Max: 158/73   Pulse Oximetry 96 % (11/21/20 0709) SpO2  Min: 96 %  Max: 98 %     Vital Today Admitted   Weight 76.7 kg (169 lb 1.5 oz) (11/18/20 1500) Weight: 76.7 kg (169 lb 1.5 oz) (11/18/20 1500)       INTAKE/OUTPUT:      Intake/Output Summary (Last 24 hours) at 11/21/2020 1118  Last data filed at 11/21/2020 0800  Gross per 24 hour   Intake 720 ml   Output --   Net 720 ml       Bowel: Stool Amount: Medium (11/21/20 0900)  Stool Occurrence: 1 (11/21/20 0900)     PVR: Bladder Scan  Reason for Scan: Post void evaluation;per order (11/19/20 1305)  Scanned Volume (mL): 3 mL (11/19/20 1305)    Diet:Daily W Breakfast; Ensure Clear Apple/clear Liquid Supplement, Apple Oral Nutrition Supplement  One Time Diet Regular; Send General Trial Of Cheese Omelet, Fresh Fruit Cup, Toasted Bagel With Cream Cheese And Jelly, 2% Milk On Timed Cart Per Speech. Thanks  Regular Diet  Nutrition Communication    PHYSICAL EXAMINATION:    Physical Exam  HENT:      Head: Normocephalic and atraumatic.      Nose: Nose normal.       Mouth/Throat:      Mouth: Mucous membranes are moist.      Pharynx: Oropharynx is clear.   Eyes:      Extraocular Movements: Extraocular movements intact.      Conjunctiva/sclera: Conjunctivae normal.   Cardiovascular:      Rate and Rhythm: Normal rate.   Pulmonary:      Effort: Pulmonary effort is normal. No respiratory distress.      Breath sounds: No wheezing or rhonchi.   Abdominal:      General: There is no distension.      Palpations: Abdomen is soft.   Musculoskeletal:      Right lower leg: No edema.      Left lower leg: No edema.      Comments: Decreased strength proximal musculature hips and shoulders   Skin:     General: Skin is warm and dry.   Neurological:      Mental Status: She is alert and oriented to person, place, and time.      Comments: The patient has good functional muscle strength all 4 extremities distally.  Proximally the left shoulder is 3-5.  Did not check the right shoulder secondary to pictures been removed  Hip strength is at most 2 out of 5 bilateral lower extremities.  Quads and dorsiflexion are greater than 3+ out of 5.  Sensation is intact     Psychiatric:         Behavior: Behavior normal.      Comments: Motivated.       Labs:   Recent Labs   Lab 11/18/20 0430 11/17/20 0430 11/16/20  1355   WBC 3.7* 3.9* 7.2   RBC 2.63* 2.61* 3.02*   HGB 8.8* 8.5* 9.8*   HCT 28.0* 27.6* 31.6*   .5* 105.7* 104.6*   MCH 33.5 32.6 32.5   MCHC 31.4* 30.8* 31.0*   RDWCV 16.9* 17.1* 16.8*    321 388   TLYMPH 25 26 5       Recent Labs   Lab 11/18/20  0430 11/17/20 2020 11/17/20  1530 11/17/20  0430   SODIUM 140 140  --  142   CHLORIDE 108* 110*  --  108*   BUN 16 18  --  20   GFRA >90 >90  --  >90   BCRAT 35* 30*  --  42*   POTASSIUM 3.4 3.8 3.1* 2.8*   GLUCOSE 68 132*  --  66   CREATININE 0.45* 0.59  --  0.48*   GFRNA >90 88  --  >90   CALCIUM 8.2* 7.9*  --  8.3*       Recent Labs   Lab 11/16/20  1347 11/16/20  0619 11/15/20  0635   GLUB 166* 78 83       Recent Labs   Lab 11/16/20  8549    INR 1.1         Imaging:   No orders to display       Assessment/Plan:   * Dermatomyositis (CMS/HCC)  Assessment & Plan  S/p IVIG x2 days 10/19, 10/20  S/p IVIG x2 days on 11/16, 11/17  On oral prednisone and Imuran  Rheumatology is following  10/20 patient on prednisone 7.5 mg daily.    Gait difficulty  Assessment & Plan  Ongoing therapy evaluations  10/20 patient continues to participate in therapies.    Dysphagia  Assessment & Plan  Speech therapy  Upgraded to regular diet thin liquids 11/19 11/20 patient tolerated p.o. diet, monitor    Transaminitis  Assessment & Plan  Elevated transaminases noted on 11/04; were not elevated prior to surgery  Liver US unremarkable  Hepatitis panel unremarkable  Imuran dose adjusted per rheum  Repeat blood testing on 11/17 demonstrating improvement  Monitor  11/20 ALT- 64, 79 AST- 53, 47. Albumin 2.0 stable    Adjustment disorder with mixed anxiety and depressed mood  Assessment & Plan  Neuropsychology to follow  Intermittently tearful. Eager to return home    Essential hypertension  Assessment & Plan  Losartan 50mg.   Monitor blood pressures  11/20 BP  Min: 139/76  Max: 164/77 CPM and monitor    Perforated duodenal ulcer (CMS/HCC)  Assessment & Plan  S/p ex-lap requiring shaun patch and cholecystectomy on 10/14 by Dr. Ventura  Required TPN  Now on oral diet      Principal Problem:    Dermatomyositis (CMS/HCC)  Active Problems:    Dysphagia    Gait difficulty    Adjustment disorder with mixed anxiety and depressed mood    Transaminitis    Perforated duodenal ulcer (CMS/HCC)    Essential hypertension    11/21/2020: Ongoing for therapies.  On prednisone taper.   JESSICA Scott

## 2024-07-28 NOTE — OB PROVIDER TRIAGE NOTE - NSHPLABSRESULTS_GEN_ALL_CORE
SONOs:   36w3d: EFW 2808g (40%), mvp 5.3cm   33w4d: vtx, anterior placenta, mvp 4.75, bpp 8/8, EFW 2290g (51%)  26w2d: EFW 1008g (68%), complete normal anatomy  21w2d: vtx, anterior placenta, no previa, normal cord insertion, limited normal anatomy         12/21   HepB NR  HepC NR  RPR neg  MMRV immune  Type A pos, Abs neg  HIV neg    07/16   GBS neg  HIV neg   RPR neg

## 2024-07-28 NOTE — OB PROVIDER H&P - ASSESSMENT
30yo  at 38w1d, GBS neg, h/o prior c/s, for IOL for gHTN  -Admit to L+D  -Monitor EFM and TOCO   -IVF and labs  -Pain control PRN  -Clear liquid diet as tolerated  -Monitor vitals  -Pt desires to TOLAC and demonstrated understanding risks/alternatives/benefits and refused repeat  and desires TOLAC for . Discussed risks of uterine rupture (with an unknown scar) including: maternal hemorrhage, fetal hemorrhage, maternal death, and fetal death associated with TOLAC versus repeat

## 2024-07-28 NOTE — OB PROVIDER TRIAGE NOTE - NS_OBGYNHISTORY_OBGYN_ALL_OB_FT
ObHx:   FT C/S for NRFHT, got to 2cm, 7lbs    GynHx: Denies h/o ovarian cysts, uterine fibroids, abnormal pap smears, or STIs

## 2024-07-28 NOTE — PROGRESS NOTE ADULT - SUBJECTIVE AND OBJECTIVE BOX
PGY4 Note    Patient seen at bedside for evaluation of labor progression, doing well, no complaints.     T(F): 98.24 (24 @ 08:02), Max: 98.24 (24 @ 08:02)  HR: 92 (24 @ 15:21) (63 - 106)  BP: 130/60 (24 @ 15:16) (91/52 - 156/94)  RR: 18 (24 @ 05:32) (18 - 18)  SpO2: 95% (24 @ 15:21) (89% - 99%)    EFM: 130/mod/pos acc  TOCO: 2-3 mins  SVE: 4-5/80/-2, vtx, AROM clear per Dr Enciso    Medications:  (ADM OVERRIDE): 1 Each (24 @ 08:58)  (ADM OVERRIDE): 1 Each (24 @ 06:46)  lactated ringers.: 125 mL/Hr (24 @ 05:25)  oxytocin Infusion.: 1 mL/Hr (24 @ 08:52)      Labs:                        12.9   13.21 )-----------( 147      ( 2024 02:55 )             36.9         135  |  103  |  7<L>  ----------------------------<  89  4.1   |  21  |  <0.5<L>    Ca    8.9      2024 02:55    TPro  6.1  /  Alb  3.7  /  TBili  0.7  /  DBili  x   /  AST  17  /  ALT  10  /  AlkPhos  235<H>      ABO RH Interpretation: A POS (24 @ 05:55)    Antibody Screen: NEG (24 @ 05:55)    Urinalysis Basic - ( 2024 03:10 )    Color: Yellow / Appearance: Clear / S.010 / pH: x  Gluc: x / Ketone: Negative mg/dL  / Bili: Negative / Urobili: 0.2 mg/dL   Blood: x / Protein: Negative mg/dL / Nitrite: Negative   Leuk Esterase: Negative / RBC: x / WBC x   Sq Epi: x / Non Sq Epi: x / Bacteria: x          Uric Acid: 3.1 mg/dL (24 @ 02:55)    Lactate Dehydrogenase, Serum: 185 (24 @ 02:55)    Protein/Creatinine Ratio Calculation: 0.1 Ratio (24 @ 03:10)

## 2024-07-28 NOTE — CHART NOTE - NSCHARTNOTEFT_GEN_A_CORE
KIMBERLY PGY1 Note:     At 1730, patient seen and examined at bedside due to prolonged deceleration lasting for 4 min. Recovered s/p resuscitation and positioning on right lateral position, oxygen therapy, fluid bolus, and turning off pitocin. Patient currently comfortable.    Now:   EFM: 140/moderate/no decels present. Category I tracing.  TOCO: q3-4min  SVE: 4.5/80/-2 as per Dr. Enciso    A/P:   30yo  at 38w1d, GBS neg, h/o prior c/s, for IOL for gHTN    -continuous EFM/[place]  -Reposition  -Continue Oxygen  -IVF
Pt examined post-epidural.   Cvx ft /50%/-1   FHr Cat1  Cvx ripening balloon placed 80/80  -will re-evaluate in 4-6hrs

## 2024-07-28 NOTE — OB PROVIDER H&P - HISTORY OF PRESENT ILLNESS
28yo  at 38w1d MELVI 8/10/24 by 1st trimester sono, presents for elevated blood pressures. Pt states she had home BP of 150/110, did not perform repeat. Denies headache, visual disturbances, chest pain, SOB, RUQ pain, or LE edema. Pt states BPs have been normotensive throughout pregnancy however in the past week they have been slowly rising 130-140s/80s. Currently taking ASA 81mg for preeclampsia precautions. Endorses mild, irregular contractions. Denies lof or vb. Endorses good FM. H/o C/S for NRFHR, desires to TOLAC. Denies any other complications this pregnancy. GBS negative.    ADDENDUM: While in triage, pt met criteria for gHTN. Recommended delivery at this time given already 38wks GA. Pt desires to TOLAC, amenable to induction at this time. [facility] w[facility]

## 2024-07-28 NOTE — PROCEDURE NOTE - ADDITIONAL PROCEDURE DETAILS
Thorough discussion of patient's history, as indicated above.  Discussed risks of spinal/epidural, including PDPH, inadequate analgesia occasionally requiring epidural catheter replacement/ general anesthesia, bleeding, infection and spinal cord injury. hypotension and nausea. Patient expressed understanding of these risks, signed informed consent and wishes to proceed with spinal/epidural.Patient sitting. Lumbar epidural performed at L3-4. Standard ASA monitors including FHR. Sterile gloves, Chloro-prep. 1% lidocaine for local infiltration. 17g touhy. ANYA with air at 5 cm. 27g lisa used to make a dural puncture with + CSF. Lisa needle removed and epidural catheter threaded easily. Touhy needle removed. Catheter secured in place at 10  cm. Negative aspiration. Test dose consisting of 3ml 1.5% lidocaine with epinephrine was negative. Patient tolerated procedure and was hemodynamically stable throughout. T10 level bilaterally.

## 2024-07-28 NOTE — PROGRESS NOTE ADULT - SUBJECTIVE AND OBJECTIVE BOX
Ob Attending Progress    Patient seen at bedside for evaluation of labor progression.  Reports feeling ctx but Comfortable s/p epidural.    T(F): 98.6 (19:34)  HR: 94 (20:46)  BP: 109/68 (20:45)  RR: 16 (17:00)  EFM: Cat 1 overall   TOCO: Q3-4min   SVE: 5/90/-2     Labs:                        12.9   13.21 )-----------( 147      ( 2024 02:55 )             36.9         135  |  103  |  7<L>  ----------------------------<  89  4.1   |  21  |  <0.5<L>    Ca    8.9      2024 02:55    TPro  6.1  /  Alb  3.7  /  TBili  0.7  /  DBili  x   /  AST  17  /  ALT  10  /  AlkPhos  235<H>      ABO RH Interpretation: A POS (24 @ 05:55)    Urinalysis Basic - ( 2024 03:10 )    Color: Yellow / Appearance: Clear / S.010 / pH: x  Gluc: x / Ketone: Negative mg/dL  / Bili: Negative / Urobili: 0.2 mg/dL   Blood: x / Protein: Negative mg/dL / Nitrite: Negative   Leuk Esterase: Negative / RBC: x / WBC x   Sq Epi: x / Non Sq Epi: x / Bacteria: x          Meds: lactated ringers. 1000 milliLiter(s) IV Continuous <Continuous>  oxytocin Infusion 333.333 milliUNIT(s)/Min IV Continuous <Continuous>  oxytocin Infusion. 1 milliUNIT(s)/Min IV Continuous <Continuous>      A/P:  29 P1 at 38.1 wks, Previous c/s, undergoing IOL for Ghtn , pressures stable, s/p arom at 3:19 pm following cvx balloon. Progressing slowly but cvx effacing more now and vtx descends nicely w/ ctx   -continue to follow FHR pattern and ctx, oxytocin at 3mu/min, will increase to 4 given ctx still irregular   -re-evaluate 2-3 hrs

## 2024-07-29 LAB
BASOPHILS # BLD AUTO: 0.03 K/UL — SIGNIFICANT CHANGE UP (ref 0–0.2)
BASOPHILS NFR BLD AUTO: 0.2 % — SIGNIFICANT CHANGE UP (ref 0–1)
EOSINOPHIL # BLD AUTO: 0.07 K/UL — SIGNIFICANT CHANGE UP (ref 0–0.7)
EOSINOPHIL NFR BLD AUTO: 0.4 % — SIGNIFICANT CHANGE UP (ref 0–8)
HCT VFR BLD CALC: 33.9 % — LOW (ref 37–47)
HGB BLD-MCNC: 11.5 G/DL — LOW (ref 12–16)
IMM GRANULOCYTES NFR BLD AUTO: 0.6 % — HIGH (ref 0.1–0.3)
LYMPHOCYTES # BLD AUTO: 12.8 % — LOW (ref 20.5–51.1)
LYMPHOCYTES # BLD AUTO: 2.48 K/UL — SIGNIFICANT CHANGE UP (ref 1.2–3.4)
MCHC RBC-ENTMCNC: 33 PG — HIGH (ref 27–31)
MCHC RBC-ENTMCNC: 33.9 G/DL — SIGNIFICANT CHANGE UP (ref 32–37)
MCV RBC AUTO: 97.4 FL — SIGNIFICANT CHANGE UP (ref 81–99)
MONOCYTES # BLD AUTO: 0.82 K/UL — HIGH (ref 0.1–0.6)
MONOCYTES NFR BLD AUTO: 4.2 % — SIGNIFICANT CHANGE UP (ref 1.7–9.3)
NEUTROPHILS # BLD AUTO: 15.88 K/UL — HIGH (ref 1.4–6.5)
NEUTROPHILS NFR BLD AUTO: 81.8 % — HIGH (ref 42.2–75.2)
NRBC # BLD: 0 /100 WBCS — SIGNIFICANT CHANGE UP (ref 0–0)
PLATELET # BLD AUTO: 121 K/UL — LOW (ref 130–400)
PMV BLD: 13.3 FL — HIGH (ref 7.4–10.4)
PRENATAL SYPHILIS TEST: SIGNIFICANT CHANGE UP
RBC # BLD: 3.48 M/UL — LOW (ref 4.2–5.4)
RBC # FLD: 13.2 % — SIGNIFICANT CHANGE UP (ref 11.5–14.5)
WBC # BLD: 19.4 K/UL — HIGH (ref 4.8–10.8)
WBC # FLD AUTO: 19.4 K/UL — HIGH (ref 4.8–10.8)

## 2024-07-29 RX ORDER — CLOSTRIDIUM TETANI TOXOID ANTIGEN (FORMALDEHYDE INACTIVATED), CORYNEBACTERIUM DIPHTHERIAE TOXOID ANTIGEN (FORMALDEHYDE INACTIVATED), BORDETELLA PERTUSSIS TOXOID ANTIGEN (GLUTARALDEHYDE INACTIVATED), BORDETELLA PERTUSSIS FILAMENTOUS HEMAGGLUTININ ANTIGEN (FORMALDEHYDE INACTIVATED), BORDETELLA PERTUSSIS PERTACTIN ANTIGEN, AND BORDETELLA PERTUSSIS FIMBRIAE 2/3 ANTIGEN 5; 2; 2.5; 5; 3; 5 [LF]/.5ML; [LF]/.5ML; UG/.5ML; UG/.5ML; UG/.5ML; UG/.5ML
0.5 INJECTION, SUSPENSION INTRAMUSCULAR ONCE
Refills: 0 | Status: DISCONTINUED | OUTPATIENT
Start: 2024-07-29 | End: 2024-07-30

## 2024-07-29 RX ORDER — OXYTOCIN/RINGER'S LACTATE 20/1000 ML
41.67 PLASTIC BAG, INJECTION (ML) INTRAVENOUS
Qty: 20 | Refills: 0 | Status: DISCONTINUED | OUTPATIENT
Start: 2024-07-29 | End: 2024-07-30

## 2024-07-29 RX ORDER — WITCH HAZEL 500 MG/1
1 CLOTH TOPICAL EVERY 4 HOURS
Refills: 0 | Status: DISCONTINUED | OUTPATIENT
Start: 2024-07-29 | End: 2024-07-30

## 2024-07-29 RX ORDER — SIMETHICONE 125 MG/1
80 TABLET, CHEWABLE ORAL EVERY 4 HOURS
Refills: 0 | Status: DISCONTINUED | OUTPATIENT
Start: 2024-07-29 | End: 2024-07-30

## 2024-07-29 RX ORDER — ACETAMINOPHEN 500 MG
975 TABLET ORAL
Refills: 0 | Status: DISCONTINUED | OUTPATIENT
Start: 2024-07-29 | End: 2024-07-30

## 2024-07-29 RX ORDER — IBUPROFEN 200 MG
600 TABLET ORAL EVERY 6 HOURS
Refills: 0 | Status: DISCONTINUED | OUTPATIENT
Start: 2024-07-29 | End: 2024-07-30

## 2024-07-29 RX ORDER — OXYCODONE HYDROCHLORIDE 30 MG/1
5 TABLET ORAL
Refills: 0 | Status: DISCONTINUED | OUTPATIENT
Start: 2024-07-29 | End: 2024-07-30

## 2024-07-29 RX ORDER — OXYCODONE HYDROCHLORIDE 30 MG/1
5 TABLET ORAL ONCE
Refills: 0 | Status: DISCONTINUED | OUTPATIENT
Start: 2024-07-29 | End: 2024-07-30

## 2024-07-29 RX ORDER — CRANBERRY FRUIT EXTRACT 650 MG
1 CAPSULE ORAL DAILY
Refills: 0 | Status: DISCONTINUED | OUTPATIENT
Start: 2024-07-29 | End: 2024-07-30

## 2024-07-29 RX ORDER — BACTERIOSTATIC SODIUM CHLORIDE 0.9 %
3 VIAL (ML) INJECTION EVERY 8 HOURS
Refills: 0 | Status: DISCONTINUED | OUTPATIENT
Start: 2024-07-29 | End: 2024-07-30

## 2024-07-29 RX ORDER — DIBUCAINE 1 %
1 OINTMENT (GRAM) TOPICAL EVERY 6 HOURS
Refills: 0 | Status: DISCONTINUED | OUTPATIENT
Start: 2024-07-29 | End: 2024-07-30

## 2024-07-29 RX ORDER — KETOROLAC TROMETHAMINE 10 MG
30 TABLET ORAL ONCE
Refills: 0 | Status: DISCONTINUED | OUTPATIENT
Start: 2024-07-29 | End: 2024-07-29

## 2024-07-29 RX ORDER — LANOLIN 100 %
1 OINTMENT (GRAM) TOPICAL EVERY 6 HOURS
Refills: 0 | Status: DISCONTINUED | OUTPATIENT
Start: 2024-07-29 | End: 2024-07-30

## 2024-07-29 RX ORDER — HYDROCORTISONE 1 %
1 CREAM (GRAM) TOPICAL EVERY 6 HOURS
Refills: 0 | Status: DISCONTINUED | OUTPATIENT
Start: 2024-07-29 | End: 2024-07-30

## 2024-07-29 RX ORDER — MAGNESIUM HYDROXIDE 400 MG/5ML
30 SUSPENSION, ORAL (FINAL DOSE FORM) ORAL
Refills: 0 | Status: DISCONTINUED | OUTPATIENT
Start: 2024-07-29 | End: 2024-07-30

## 2024-07-29 RX ORDER — IBUPROFEN 200 MG
600 TABLET ORAL EVERY 6 HOURS
Refills: 0 | Status: COMPLETED | OUTPATIENT
Start: 2024-07-29 | End: 2025-06-27

## 2024-07-29 RX ORDER — DIPHENHYDRAMINE HCL 25 MG
25 CAPSULE ORAL EVERY 6 HOURS
Refills: 0 | Status: DISCONTINUED | OUTPATIENT
Start: 2024-07-29 | End: 2024-07-30

## 2024-07-29 RX ADMIN — Medication 1 SPRAY(S): at 04:45

## 2024-07-29 RX ADMIN — Medication 975 MILLIGRAM(S): at 04:45

## 2024-07-29 RX ADMIN — Medication 600 MILLIGRAM(S): at 18:55

## 2024-07-29 RX ADMIN — Medication 1 TABLET(S): at 14:09

## 2024-07-29 RX ADMIN — Medication 975 MILLIGRAM(S): at 09:57

## 2024-07-29 RX ADMIN — WITCH HAZEL 1 APPLICATION(S): 500 CLOTH TOPICAL at 04:45

## 2024-07-29 RX ADMIN — Medication 600 MILLIGRAM(S): at 18:25

## 2024-07-29 RX ADMIN — Medication 3 MILLILITER(S): at 14:15

## 2024-07-29 RX ADMIN — Medication 3 MILLILITER(S): at 21:07

## 2024-07-29 RX ADMIN — Medication 1 APPLICATION(S): at 06:31

## 2024-07-29 RX ADMIN — Medication 600 MILLIGRAM(S): at 13:57

## 2024-07-29 RX ADMIN — Medication 975 MILLIGRAM(S): at 21:03

## 2024-07-29 RX ADMIN — Medication 975 MILLIGRAM(S): at 15:47

## 2024-07-29 RX ADMIN — Medication 30 MILLIGRAM(S): at 03:32

## 2024-07-29 RX ADMIN — Medication 975 MILLIGRAM(S): at 05:42

## 2024-07-29 RX ADMIN — Medication 3 MILLILITER(S): at 06:00

## 2024-07-29 RX ADMIN — Medication 975 MILLIGRAM(S): at 22:01

## 2024-07-29 RX ADMIN — Medication 600 MILLIGRAM(S): at 13:27

## 2024-07-29 RX ADMIN — Medication 975 MILLIGRAM(S): at 09:27

## 2024-07-29 RX ADMIN — Medication 975 MILLIGRAM(S): at 15:17

## 2024-07-29 NOTE — OB PROVIDER DELIVERY SUMMARY - NSSELHIDDEN_OBGYN_ALL_OB_FT
[NS_DeliveryAttending1_OBGYN_ALL_OB_FT:[email or web address]=],[NS_DeliveryRN_OBGYN_ALL_OB_FT:[id number]=]

## 2024-07-29 NOTE — OB PROVIDER DELIVERY SUMMARY - NSPROVIDERDELIVERYNOTE_OBGYN_ALL_OB_FT
Patient was fully dilated and pushed. NSD live female , Apgars 9/9 , over a midline episiotomy without extension. Vtx delivered OA,Fetal head restituted to LOT. The anterior and posterior shoulders delivered, followed by the remaining body atraumatically. Delayed cord clamping was performed, and then clamped and cut. Cord blood & gases collected. The  was handed to mother for skin to skin. The placenta delivered spontaneously intact with 3v cord. Uterus massaged and firm with oxytocin given IV. Cervix, vagina and perineum inspected. Repair of episiotomy, in the usual fashion with 2-0, 3-0 chromic.   QBL -200cc, hemostasis assured.

## 2024-07-30 ENCOUNTER — TRANSCRIPTION ENCOUNTER (OUTPATIENT)
Age: 29
End: 2024-07-30

## 2024-07-30 ENCOUNTER — APPOINTMENT (OUTPATIENT)
Dept: OBGYN | Facility: CLINIC | Age: 29
End: 2024-07-30

## 2024-07-30 VITALS
RESPIRATION RATE: 18 BRPM | SYSTOLIC BLOOD PRESSURE: 105 MMHG | OXYGEN SATURATION: 96 % | DIASTOLIC BLOOD PRESSURE: 71 MMHG | HEART RATE: 77 BPM | TEMPERATURE: 98 F

## 2024-07-30 RX ORDER — ACETAMINOPHEN 500 MG
2 TABLET ORAL
Qty: 0 | Refills: 0 | DISCHARGE
Start: 2024-07-30

## 2024-07-30 RX ORDER — IBUPROFEN 200 MG
1 TABLET ORAL
Qty: 0 | Refills: 0 | DISCHARGE
Start: 2024-07-30

## 2024-07-30 RX ADMIN — Medication 600 MILLIGRAM(S): at 00:51

## 2024-07-30 RX ADMIN — Medication 600 MILLIGRAM(S): at 12:48

## 2024-07-30 RX ADMIN — Medication 600 MILLIGRAM(S): at 00:00

## 2024-07-30 RX ADMIN — Medication 3 MILLILITER(S): at 06:20

## 2024-07-30 RX ADMIN — Medication 975 MILLIGRAM(S): at 09:39

## 2024-07-30 RX ADMIN — Medication 975 MILLIGRAM(S): at 09:09

## 2024-07-30 RX ADMIN — Medication 600 MILLIGRAM(S): at 06:21

## 2024-07-30 RX ADMIN — Medication 975 MILLIGRAM(S): at 03:40

## 2024-07-30 RX ADMIN — Medication 600 MILLIGRAM(S): at 13:18

## 2024-07-30 RX ADMIN — Medication 1 TABLET(S): at 12:48

## 2024-07-30 RX ADMIN — Medication 975 MILLIGRAM(S): at 02:45

## 2024-07-30 NOTE — DISCHARGE NOTE OB - CARE PROVIDER_API CALL
Desiree Mandujano  Obstetrics and Gynecology  [address] [place] [address]  Phone: [phone]  Fax: [phone]  Follow Up Time:

## 2024-07-30 NOTE — DISCHARGE NOTE OB - CARE PLAN
Principal Discharge DX:	Vaginal delivery  Assessment and plan of treatment:	Nothing in the vagina for 6 weeks (no sex, no tampons, no douching). Avoid tub baths, you may shower.  If you have a fever of 100.4F or greater, severe vaginal bleeding, or severe abdominal pain, call your Ob/Gyn or come to the emergency department immediately.  Please follow up with your provider in 6 weeks for postpartum visit.  Secondary Diagnosis:	Gestational hypertension  Assessment and plan of treatment:	Please continue to monitor your blood pressures 2x per day while at rest. If your blood pressure is more than 160 on top, or 110 on the bottom, please call your doctor and come to labor and delivery. If you have a headache that will not go away, vision changes, chest pain, shortness of breath, pain in the upper right part of your belly, or new and increasing lower leg swelling, please call your doctor and come to labor and delivery. Please follow up with your provider in 1 week for a blood pressure check. Please continue to take any blood pressure medications your doctor has prescribed for you.   1

## 2024-07-30 NOTE — DISCHARGE NOTE OB - NS MD DC FALL RISK RISK
For information on Fall & Injury Prevention, visit: [email or web address] OR  [email or web address] OR  https://www.cdc.gov/steadi/patient.html

## 2024-07-30 NOTE — DISCHARGE NOTE OB - NSPROMEDSBROUGHTTOHOSP_GEN_A_NUR
Assessment:          1  Encounter for routine child health examination without abnormal findings            Plan:          1  Anticipatory guidance: Specific topics reviewed: avoid potential choking hazards (large, spherical, or coin shaped foods), avoid small toys (choking hazard), car seat issues, including proper placement and transition to toddler seat at 20 pounds, caution with possible poisons (including pills, plants, cosmetics), child-proof home with cabinet locks, outlet plugs, window guards, and stair safety inman, discipline issues (limit-setting, positive reinforcement), fluoride supplementation if unfluoridated water supply, importance of varied diet, media violence, never leave unattended, observe while eating; consider CPR classes, obtain and know how to use thermometer, Poison Control phone number [phone], read together, risk of child pulling down objects on him/herself, safe storage of any firearms in the home, setting hot water heater less that 120 degrees F and smoke detectors  2  Immunizations today: per orders  Discussed with: mother  The benefits, contraindication and side effects for the following vaccines were reviewed: none  Total number of components reveiwed: 1    3  Follow-up visit in 6 months for next well child visit, or sooner as needed  Subjective:     Mana Copeland is a 5 y o  female who is here for this well child visit  Current Issues:  Here with mom for [facility]  UTD on IMX  No dry skin anymore  Has upcoming dental appt- good dental hygiene  Passed Ages and Stages- no issues with development    Well Child Assessment:  History was provided by the mother  Goledn Mcclelland lives with her mother, father and brother (one dog )  Interval problems do not include recent illness or recent injury   (None )     Nutrition  Types of intake include eggs, fish, fruits, juices, meats, vegetables and junk food (8oz of lactose free milk daily in sippy, 2 serving of fruit daily, eats vegetables all day , will eat eggs and fish/shrip, does not like juice drinks mainly water, will eat meat everyday with dinner/lunch  )  Junk food includes desserts, chips and fast food (fast food twice a month )  Dental  The patient does not have a dental home (brushes teeth twice a day )  Elimination  Elimination problems include constipation  Elimination problems do not include diarrhea, gas or urinary symptoms  (Constipation is better then the last time pt was seen goes every day but stool is still haard )   Behavioral  Behavioral issues do not include biting, hitting, stubbornness or throwing tantrums  (Sometimes wakes up at night ) Disciplinary methods include taking away privileges, time outs and praising good behavior  Sleep  The patient sleeps in her own bed  Average sleep duration is 9 (sometimes takes 1 nap for 1 hours ) hours  There are no sleep problems  Safety  Home is child-proofed? yes  There is no smoking in the home  Home has working smoke alarms? yes  Home has working carbon monoxide alarms? yes  There is an appropriate car seat in use  Screening  There are no risk factors for hearing loss  There are no risk factors for anemia  There are no risk factors for tuberculosis  Social  Childcare is provided at   The childcare provider is a  provider [employer] )  The child spends 2 (wednesday and fridays ) days per week at   The child spends 6 hours per day at   Sibling interactions are good         The following portions of the patient's history were reviewed and updated as appropriate: allergies, current medications, past medical history, past social history, past surgical history and problem list     Developmental 24 Months Appropriate     Question Response Comments    Appropriately uses at least 3 words other than 'jean paul' and 'mama' Yes Yes on 9/22/2020 (Age - 4yrs)    Can take off clothes, including pants and pullover shirts Yes Yes on 9/22/2020 (Age - 4yrs) Can walk up steps by self without holding onto the next stair Yes Yes on 9/22/2020 (Age - 4yrs)    Can point to at least 1 part of body when asked, without prompting Yes Yes on 9/22/2020 (Age - 4yrs)    Feeds with spoon or fork without spilling much Yes Yes on 9/22/2020 (Age - 4yrs)    Can kick a small ball (e g  tennis ball) forward without support Yes Yes on 9/22/2020 (Age - 4yrs)          Ages & Stages Questionnaire      Most Recent Value   AGES AND STAGES 30 MONTHS  P                  Objective:      Growth parameters are noted and are appropriate for age  Wt Readings from Last 1 Encounters:   09/22/20 12 2 kg (27 lb) (28 %, Z= -0 60)*     * Growth percentiles are based on CDC (Girls, 2-20 Years) data  Ht Readings from Last 1 Encounters:   09/22/20 2' 10 5" (0 876 m) (22 %, Z= -0 76)*     * Growth percentiles are based on [facility] (Girls, 2-20 Years) data  Body mass index is 15 95 kg/m²  Vitals:    09/22/20 1328   Temp: 98 2 °F (36 8 °C)   Weight: 12 2 kg (27 lb)   Height: 2' 10 5" (0 876 m)   HC: 46 4 cm (18 25")       Physical Exam  Vitals signs and nursing note reviewed  Constitutional:       General: She is active  Appearance: She is well-developed  Comments: Petite little girl in NAD   HENT:      Right Ear: Tympanic membrane normal       Left Ear: Tympanic membrane normal       Ears:      Comments: Dry flaky cerumen noted charly narrowed canals, flaked out but able to see opaque TMs charly with good cone of light     Nose: Nose normal       Mouth/Throat:      Mouth: Mucous membranes are moist       Dentition: No dental caries  Pharynx: Oropharynx is clear  Eyes:      Conjunctiva/sclera: Conjunctivae normal       Pupils: Pupils are equal, round, and reactive to light  Neck:      Musculoskeletal: Normal range of motion and neck supple  Cardiovascular:      Rate and Rhythm: Normal rate and regular rhythm  Pulses: Normal pulses        Heart sounds: S1 normal and S2 normal  Murmur (innocent murmur noted) present  Pulmonary:      Effort: Pulmonary effort is normal  No respiratory distress  Breath sounds: Normal breath sounds  Abdominal:      General: Bowel sounds are normal  There is no distension  Palpations: Abdomen is soft  Tenderness: There is no abdominal tenderness  Genitourinary:     Comments: John Paul 1 female  Musculoskeletal: Normal range of motion  Skin:     General: Skin is warm and dry  Neurological:      Mental Status: She is alert  no

## 2024-07-30 NOTE — PROGRESS NOTE ADULT - SUBJECTIVE AND OBJECTIVE BOX
Pt doing well, pain well controlled. Denies heavy vaginal bleeding. No overnight events, no acute complaints. Ambulating without difficulty, voiding, and tolerating regular diet. Breastfeeding.    PAST MEDICAL & SURGICAL HISTORY:  No pertinent past medical history      H/O  section          Physical Exam  Vital Signs Last 24 Hrs  T(F): 98.1 (2024 08:14), Max: 98.3 (2024 05:13)  HR: 68 (2024 08:14) (68 - 78)  BP: 102/63 (2024 08:14) (102/63 - 111/67)  RR: 18 (2024 08:14) (18 - 19)    Gen: AAOx3, NAD  Abd: Soft, nontender, nondistended  Fundus: Firm, nontender, below the umbilicus  Lochia: Minimal  Ext: No calf tenderness, no swelling    Labs:                        11.5   19.40 )-----------( 121      ( 2024 11:07 )             33.9                         12.9   13.21 )-----------( 147      ( 2024 02:55 )             36.9

## 2024-07-30 NOTE — DISCHARGE NOTE OB - PATIENT PORTAL LINK FT
You can access the FollowMyHealth Patient Portal offered by [facility] by registering at the following website: [email or web address]. By joining [employer]’s FollowMyHealth portal, you will also be able to view your health information using other applications (apps) compatible with our system.

## 2024-07-30 NOTE — DISCHARGE NOTE OB - PLAN OF CARE
Please continue to monitor your blood pressures 2x per day while at rest. If your blood pressure is more than 160 on top, or 110 on the bottom, please call your doctor and come to labor and delivery. If you have a headache that will not go away, vision changes, chest pain, shortness of breath, pain in the upper right part of your belly, or new and increasing lower leg swelling, please call your doctor and come to labor and delivery. Please follow up with your provider in 1 week for a blood pressure check. Please continue to take any blood pressure medications your doctor has prescribed for you. Nothing in the vagina for 6 weeks (no sex, no tampons, no douching). Avoid tub baths, you may shower.  If you have a fever of 100.4F or greater, severe vaginal bleeding, or severe abdominal pain, call your Ob/Gyn or come to the emergency department immediately.  Please follow up with your provider in 6 weeks for postpartum visit.

## 2024-07-30 NOTE — DISCHARGE NOTE OB - CARE PROVIDERS DIRECT ADDRESSES
[email or web address]

## 2024-07-30 NOTE — PROGRESS NOTE ADULT - ASSESSMENT
PPD#2 s/p  pt doing well, for discharge home today
28yo  at 38w1d, GBS neg, h/o prior c/s, for IOL for gHTN, s/p epi, s/p CRB, s/p AROM, desiring TOLAC  -Monitor EFM and TOCO   -IVF and labs  -Pain control with epidural  -Clear liquid diet as tolerated  -Monitor vitals  -c/w pitocin, currently at 3U    Dr Enciso bedside

## 2024-08-05 DIAGNOSIS — O34.219 MATERNAL CARE FOR UNSPECIFIED TYPE SCAR FROM PREVIOUS CESAREAN DELIVERY: ICD-10-CM

## 2024-08-05 DIAGNOSIS — Z3A.38 38 WEEKS GESTATION OF PREGNANCY: ICD-10-CM

## 2024-08-05 DIAGNOSIS — Z28.09 IMMUNIZATION NOT CARRIED OUT BECAUSE OF OTHER CONTRAINDICATION: ICD-10-CM

## 2024-08-05 DIAGNOSIS — Z79.82 LONG TERM (CURRENT) USE OF ASPIRIN: ICD-10-CM

## 2024-08-06 PROBLEM — Z78.9 OTHER SPECIFIED HEALTH STATUS: Chronic | Status: ACTIVE | Noted: 2024-07-28

## 2024-08-09 ENCOUNTER — APPOINTMENT (OUTPATIENT)
Dept: OBGYN | Facility: CLINIC | Age: 29
End: 2024-08-09

## 2024-09-03 ENCOUNTER — APPOINTMENT (OUTPATIENT)
Dept: OBGYN | Facility: CLINIC | Age: 29
End: 2024-09-03
Payer: COMMERCIAL

## 2024-09-03 ENCOUNTER — OUTPATIENT (OUTPATIENT)
Dept: INPATIENT UNIT | Facility: HOSPITAL | Age: 29
LOS: 1 days | Discharge: HOME | End: 2024-09-03
Payer: COMMERCIAL

## 2024-09-03 VITALS
BODY MASS INDEX: 19.93 KG/M2 | SYSTOLIC BLOOD PRESSURE: 148 MMHG | DIASTOLIC BLOOD PRESSURE: 98 MMHG | HEIGHT: 66 IN | WEIGHT: 124 LBS

## 2024-09-03 VITALS
RESPIRATION RATE: 18 BRPM | TEMPERATURE: 97 F | SYSTOLIC BLOOD PRESSURE: 150 MMHG | DIASTOLIC BLOOD PRESSURE: 85 MMHG | HEART RATE: 89 BPM

## 2024-09-03 DIAGNOSIS — Z98.891 HISTORY OF UTERINE SCAR FROM PREVIOUS SURGERY: Chronic | ICD-10-CM

## 2024-09-03 DIAGNOSIS — O26.899 OTHER SPECIFIED PREGNANCY RELATED CONDITIONS, UNSPECIFIED TRIMESTER: ICD-10-CM

## 2024-09-03 LAB
APPEARANCE UR: ABNORMAL
BILIRUB UR-MCNC: NEGATIVE — SIGNIFICANT CHANGE UP
COLOR SPEC: YELLOW — SIGNIFICANT CHANGE UP
DIFF PNL FLD: NEGATIVE — SIGNIFICANT CHANGE UP
GLUCOSE UR QL: NEGATIVE MG/DL — SIGNIFICANT CHANGE UP
KETONES UR-MCNC: ABNORMAL MG/DL
LEUKOCYTE ESTERASE UR-ACNC: ABNORMAL
NITRITE UR-MCNC: NEGATIVE — SIGNIFICANT CHANGE UP
PH UR: 6 — SIGNIFICANT CHANGE UP (ref 5–8)
PROT UR-MCNC: NEGATIVE MG/DL — SIGNIFICANT CHANGE UP
SP GR SPEC: 1.01 — SIGNIFICANT CHANGE UP (ref 1–1.03)
UROBILINOGEN FLD QL: 0.2 MG/DL — SIGNIFICANT CHANGE UP (ref 0.2–1)

## 2024-09-03 PROCEDURE — 84156 ASSAY OF PROTEIN URINE: CPT

## 2024-09-03 PROCEDURE — 0503F POSTPARTUM CARE VISIT: CPT

## 2024-09-03 PROCEDURE — 85025 COMPLETE CBC W/AUTO DIFF WBC: CPT

## 2024-09-03 PROCEDURE — 86850 RBC ANTIBODY SCREEN: CPT

## 2024-09-03 PROCEDURE — 36415 COLL VENOUS BLD VENIPUNCTURE: CPT

## 2024-09-03 PROCEDURE — 84550 ASSAY OF BLOOD/URIC ACID: CPT

## 2024-09-03 PROCEDURE — 99214 OFFICE O/P EST MOD 30 MIN: CPT

## 2024-09-03 PROCEDURE — 86900 BLOOD TYPING SEROLOGIC ABO: CPT

## 2024-09-03 PROCEDURE — 81001 URINALYSIS AUTO W/SCOPE: CPT

## 2024-09-03 PROCEDURE — 82570 ASSAY OF URINE CREATININE: CPT

## 2024-09-03 PROCEDURE — 86901 BLOOD TYPING SEROLOGIC RH(D): CPT

## 2024-09-03 PROCEDURE — 83615 LACTATE (LD) (LDH) ENZYME: CPT

## 2024-09-03 PROCEDURE — 80053 COMPREHEN METABOLIC PANEL: CPT

## 2024-09-03 RX ORDER — NORETHINDRONE 0.35 MG/1
0.35 TABLET ORAL
Qty: 28 | Refills: 5 | Status: ACTIVE | COMMUNITY
Start: 2024-09-03 | End: 1900-01-01

## 2024-09-03 RX ORDER — NIFEDIPINE 60 MG/1
30 TABLET, FILM COATED, EXTENDED RELEASE ORAL ONCE
Refills: 0 | Status: COMPLETED | OUTPATIENT
Start: 2024-09-03 | End: 2024-09-03

## 2024-09-03 NOTE — OB POSTPARTUM TRIAGE NOTE - NSOBPROVIDERNOTE_OBGYN_ALL_OB_FT
A/P: 30yo P2, gHTN, postpartum here for BP monitoring.    -monitor vitals  -monitor s/sx of PEC  -f/u PELs  -PO hydration encouraged  -consider Procardia 30 XL for BP control    Discussed with Dr. Nolasco and Dr. Smith A/P: 28yo P2, gHTN, postpartum here for BP monitoring.    -monitor vitals  -monitor s/sx of PEC  -PELs wnl  -f/u urprcr  -PO hydration encouraged  -Procardia 30 XL for BP control started    Discussed with Dr. Nolasco and Dr. Smith

## 2024-09-03 NOTE — OB POSTPARTUM TRIAGE NOTE - NS_OBGYNHISTORY_OBGYN_ALL_OB_FT
Ob hx:   P1- c/s for NRFHRT, 6-7  P2-  24    Gyn hx: Right sided 3x2 dermoid cyst, denies h/o abnormal paps, STIs, uterine fibroids, endometriosis

## 2024-09-03 NOTE — OB RN TRIAGE NOTE - FALL HARM RISK - CONCLUSION
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  Knee immobilizer  - pain control  - Lovenox  - Pending transfer to tertiary center for orthopaedic oncology. Would not obtain biopsy of the lesion or other surgical intervention here.       Universal Safety Interventions

## 2024-09-03 NOTE — HISTORY OF PRESENT ILLNESS
[Complications:___] : complications include: [unfilled] [Last Pap Date: ___] : Last Pap Date: [unfilled] [Delivery Date: ___] : on [unfilled] [] : delivered by vaginal delivery [Female] : Delivery History: baby girl [Wt. ___] : weighing [unfilled] [Breastfeeding] : currently nursing [S/Sx PP Depression] : no signs/symptoms of postpartum depression [Back to Normal] : is back to normal in size [None] : no vaginal bleeding [Normal] : the vagina was normal [Healing Well] : is healing well [Cervix Sample Taken] : cervical sample taken for a Pap smear [Examination Of The Breasts] : breasts are normal [Mass] : breast mass [Doing Well] : is doing well [FreeTextEntry8] : patient here for PP exam  [FreeTextEntry9] : Patient has know history of white coat syndrome but at 38 wks had eleated pressures at home sent to labor and delivery met criteria for GHTN , admitted and IOL, . All pressures after admission were normal and all postpartum pressures were normal. She ruled out for preeclampsia.  [de-identified] : was   [de-identified] : P2 now  [de-identified] : PHQ 2 negative  [de-identified] : Normal PP exam, clear whit coat HTN , some elevated pressure in office, will f/u home pressures and text in values, if  markedly elevated to labor and delivery if not consider referal to cardiology for f/u BP management.  [de-identified] : f/u BP values, Pap done, Bse rev, Micronor for BC, will f/u w/ PCP regarding blood pressure issues.

## 2024-09-03 NOTE — OB POSTPARTUM TRIAGE NOTE - NSHPPHYSICALEXAM_GEN_ALL_CORE
Physical exam:    Vital Signs Last 24 Hrs  T(F): 97 (03 Sep 2024 22:51), Max: 97 (03 Sep 2024 22:51)  HR: 88 (03 Sep 2024 23:13) (88 - 89)  BP: 142/89 (03 Sep 2024 23:13) (142/89 - 150/85)  RR: 18 (03 Sep 2024 22:51) (18 - 18)    Gen: AAOx3, NAD  Heart: RRR, S1 S2 WNL  Lungs: CTAB  Abdomen: Soft, nontender, no distension  Ext: No calf tenderness, no swelling

## 2024-09-03 NOTE — OB POSTPARTUM TRIAGE NOTE - HISTORY OF PRESENT ILLNESS
Patient is a 30yo now P2 s/p  on 24 presenting to L&D for BP monitoring.  Patient had met criteria for gHTN during pregnancy, PELs were negative upon admission for delivery.  Patient was seen in the office today for PP visit and was found to have a BP of 148/98.  Patient reports she has not been monitoring her BPs at home as instructed.  Denies HA, blurry vision, chest pain, SOB, RUQ pain, n/v, or new swelling of hands/face/feet.

## 2024-09-03 NOTE — OB POSTPARTUM TRIAGE NOTE - NSHPLABSRESULTS_GEN_ALL_CORE
PELs pending 14.4   9.05  )-----------( 174      ( 03 Sep 2024 23:12 )             43.3           137  |  99  |  12  ----------------------------<  94  3.6   |  25  |  0.7    Ca    9.8      03 Sep 2024 23:12    TPro  7.4  /  Alb  4.6  /  TBili  1.0  /  DBili  x   /  AST  19  /  ALT  22  /  AlkPhos  119<H>            Urinalysis Basic - ( 03 Sep 2024 23:13 )    Color: Yellow / Appearance: Cloudy / S.015 / pH: x  Gluc: x / Ketone: Trace mg/dL  / Bili: Negative / Urobili: 0.2 mg/dL   Blood: x / Protein: Negative mg/dL / Nitrite: Negative   Leuk Esterase: Trace / RBC: 2 /HPF / WBC 3 /HPF   Sq Epi: x / Non Sq Epi: 3 /HPF / Bacteria: Negative /HPF    f/u urprcr

## 2024-09-03 NOTE — OB POSTPARTUM TRIAGE NOTE - NSICDXPASTSURGICALHX_GEN_ALL_CORE_FT
Patient missed scheduled visit with [facility] Team Heart Failure.    Performed outreach attempt #1 by -Called primary phone and left voice message.         PAST SURGICAL HISTORY:  H/O  section

## 2024-09-03 NOTE — OB RN TRIAGE NOTE - FALL HARM RISK - UNIVERSAL INTERVENTIONS
Bed in lowest position, wheels locked, appropriate side rails in place/Call bell, personal items and telephone in reach/Instruct patient to call for assistance before getting out of bed or chair/Non-slip footwear when patient is out of bed/[place] to call system/Physically safe environment - no spills, clutter or unnecessary equipment/Purposeful Proactive Rounding/Room/bathroom lighting operational, light cord in reach

## 2024-09-03 NOTE — OB POSTPARTUM TRIAGE NOTE - ADDITIONAL INSTRUCTIONS
Please follow up with your doctor this week for BP check. Continue to take procardia 30xL daily (sent to your pharmacy). Please call your doctor if you have headache, blurry vision, CP, SOB

## 2024-09-03 NOTE — OB POSTPARTUM TRIAGE NOTE - NSPPSELHIDDEN_OBGYN_ALL_OB_FT
[NSDelAttend1_OBGYN_A_OB_FT:MTcwMzgyMDExOTA=] [NSDelAttend1_OBGYN_A_OB_FT:[email or web address]=],[NS_AttendInformed_OBGYN_ALL_OB:MTcwMzgzMDExOTA=]

## 2024-09-04 VITALS — SYSTOLIC BLOOD PRESSURE: 142 MMHG | HEART RATE: 82 BPM | DIASTOLIC BLOOD PRESSURE: 96 MMHG

## 2024-09-04 LAB
ALBUMIN SERPL ELPH-MCNC: 4.6 G/DL — SIGNIFICANT CHANGE UP (ref 3.5–5.2)
ALP SERPL-CCNC: 119 U/L — HIGH (ref 30–115)
ALT FLD-CCNC: 22 U/L — SIGNIFICANT CHANGE UP (ref 0–41)
ANION GAP SERPL CALC-SCNC: 13 MMOL/L — SIGNIFICANT CHANGE UP (ref 7–14)
AST SERPL-CCNC: 19 U/L — SIGNIFICANT CHANGE UP (ref 0–41)
BASOPHILS # BLD AUTO: 0.04 K/UL — SIGNIFICANT CHANGE UP (ref 0–0.2)
BASOPHILS NFR BLD AUTO: 0.4 % — SIGNIFICANT CHANGE UP (ref 0–1)
BILIRUB SERPL-MCNC: 1 MG/DL — SIGNIFICANT CHANGE UP (ref 0.2–1.2)
BUN SERPL-MCNC: 12 MG/DL — SIGNIFICANT CHANGE UP (ref 10–20)
CALCIUM SERPL-MCNC: 9.8 MG/DL — SIGNIFICANT CHANGE UP (ref 8.4–10.5)
CHLORIDE SERPL-SCNC: 99 MMOL/L — SIGNIFICANT CHANGE UP (ref 98–110)
CO2 SERPL-SCNC: 25 MMOL/L — SIGNIFICANT CHANGE UP (ref 17–32)
CREAT ?TM UR-MCNC: 91 MG/DL — SIGNIFICANT CHANGE UP
CREAT SERPL-MCNC: 0.7 MG/DL — SIGNIFICANT CHANGE UP (ref 0.7–1.5)
EGFR: 121 ML/MIN/1.73M2 — SIGNIFICANT CHANGE UP
EOSINOPHIL # BLD AUTO: 0.22 K/UL — SIGNIFICANT CHANGE UP (ref 0–0.7)
EOSINOPHIL NFR BLD AUTO: 2.4 % — SIGNIFICANT CHANGE UP (ref 0–8)
GLUCOSE SERPL-MCNC: 94 MG/DL — SIGNIFICANT CHANGE UP (ref 70–99)
HCT VFR BLD CALC: 43.3 % — SIGNIFICANT CHANGE UP (ref 37–47)
HGB BLD-MCNC: 14.4 G/DL — SIGNIFICANT CHANGE UP (ref 12–16)
IMM GRANULOCYTES NFR BLD AUTO: 0.2 % — SIGNIFICANT CHANGE UP (ref 0.1–0.3)
LDH SERPL L TO P-CCNC: 187 — SIGNIFICANT CHANGE UP (ref 50–242)
LYMPHOCYTES # BLD AUTO: 3.79 K/UL — HIGH (ref 1.2–3.4)
LYMPHOCYTES # BLD AUTO: 41.9 % — SIGNIFICANT CHANGE UP (ref 20.5–51.1)
MCHC RBC-ENTMCNC: 31.6 PG — HIGH (ref 27–31)
MCHC RBC-ENTMCNC: 33.3 G/DL — SIGNIFICANT CHANGE UP (ref 32–37)
MCV RBC AUTO: 95.2 FL — SIGNIFICANT CHANGE UP (ref 81–99)
MONOCYTES # BLD AUTO: 0.71 K/UL — HIGH (ref 0.1–0.6)
MONOCYTES NFR BLD AUTO: 7.8 % — SIGNIFICANT CHANGE UP (ref 1.7–9.3)
NEUTROPHILS # BLD AUTO: 4.27 K/UL — SIGNIFICANT CHANGE UP (ref 1.4–6.5)
NEUTROPHILS NFR BLD AUTO: 47.3 % — SIGNIFICANT CHANGE UP (ref 42.2–75.2)
NRBC # BLD: 0 /100 WBCS — SIGNIFICANT CHANGE UP (ref 0–0)
PLATELET # BLD AUTO: 174 K/UL — SIGNIFICANT CHANGE UP (ref 130–400)
PMV BLD: 12.6 FL — HIGH (ref 7.4–10.4)
POTASSIUM SERPL-MCNC: 3.6 MMOL/L — SIGNIFICANT CHANGE UP (ref 3.5–5)
POTASSIUM SERPL-SCNC: 3.6 MMOL/L — SIGNIFICANT CHANGE UP (ref 3.5–5)
PROT ?TM UR-MCNC: 9 MG/DLG/24H — SIGNIFICANT CHANGE UP
PROT SERPL-MCNC: 7.4 G/DL — SIGNIFICANT CHANGE UP (ref 6–8)
PROT/CREAT UR-RTO: 0.1 RATIO — SIGNIFICANT CHANGE UP (ref 0–0.2)
RBC # BLD: 4.55 M/UL — SIGNIFICANT CHANGE UP (ref 4.2–5.4)
RBC # FLD: 11.6 % — SIGNIFICANT CHANGE UP (ref 11.5–14.5)
SODIUM SERPL-SCNC: 137 MMOL/L — SIGNIFICANT CHANGE UP (ref 135–146)
URATE SERPL-MCNC: 4.3 MG/DL — SIGNIFICANT CHANGE UP (ref 2.5–7)
WBC # BLD: 9.05 K/UL — SIGNIFICANT CHANGE UP (ref 4.8–10.8)
WBC # FLD AUTO: 9.05 K/UL — SIGNIFICANT CHANGE UP (ref 4.8–10.8)

## 2024-09-04 RX ORDER — NIFEDIPINE 60 MG/1
1 TABLET, FILM COATED, EXTENDED RELEASE ORAL
Qty: 30 | Refills: 0
Start: 2024-09-04 | End: 2024-10-03

## 2024-09-04 RX ADMIN — NIFEDIPINE 30 MILLIGRAM(S): 60 TABLET, FILM COATED, EXTENDED RELEASE ORAL at 00:07

## 2024-09-05 LAB
C TRACH RRNA SPEC QL NAA+PROBE: NOT DETECTED
N GONORRHOEA RRNA SPEC QL NAA+PROBE: NOT DETECTED
SOURCE TP AMPLIFICATION: NORMAL

## 2024-09-09 NOTE — OB RN PATIENT PROFILE - URINARY CATHETER
Detail Level: Detailed Size Of Lesion: 4 X Size Of Lesion In Cm (Optional): 3.5 Incorporate Mauc In Note: Yes Size Of Lesion: 1.5 X Size Of Lesion In Cm (Optional): 0 no

## 2024-09-12 LAB — CYTOLOGY CVX/VAG DOC THIN PREP: NORMAL

## 2025-06-26 ENCOUNTER — APPOINTMENT (OUTPATIENT)
Dept: OBGYN | Facility: CLINIC | Age: 30
End: 2025-06-26

## 2025-06-26 PROCEDURE — 36415 COLL VENOUS BLD VENIPUNCTURE: CPT

## 2025-06-26 PROCEDURE — 99459 PELVIC EXAMINATION: CPT

## 2025-06-26 PROCEDURE — 99213 OFFICE O/P EST LOW 20 MIN: CPT | Mod: 25

## 2025-06-26 PROCEDURE — 76817 TRANSVAGINAL US OBSTETRIC: CPT

## 2025-06-29 LAB
HCG SERPL-MCNC: 3 MIU/ML
PROGEST SERPL-MCNC: 0.4 NG/ML

## 2025-07-01 VITALS
HEIGHT: 66 IN | BODY MASS INDEX: 19.93 KG/M2 | SYSTOLIC BLOOD PRESSURE: 148 MMHG | DIASTOLIC BLOOD PRESSURE: 98 MMHG | WEIGHT: 124 LBS

## 2025-07-09 RX ORDER — LACTIC ACID, L-, CITRIC ACID MONOHYDRATE, AND POTASSIUM BITARTRATE 90; 50; 20 MG/5G; MG/5G; MG/5G
1.8-1-0.4 GEL VAGINAL
Qty: 1 | Refills: 0 | Status: ACTIVE | COMMUNITY
Start: 2025-07-09 | End: 1900-01-01